# Patient Record
Sex: FEMALE | Race: WHITE | NOT HISPANIC OR LATINO | Employment: OTHER | URBAN - METROPOLITAN AREA
[De-identification: names, ages, dates, MRNs, and addresses within clinical notes are randomized per-mention and may not be internally consistent; named-entity substitution may affect disease eponyms.]

---

## 2018-03-19 ENCOUNTER — OFFICE VISIT (OUTPATIENT)
Dept: URGENT CARE | Facility: CLINIC | Age: 58
End: 2018-03-19
Payer: COMMERCIAL

## 2018-03-19 VITALS
WEIGHT: 122 LBS | BODY MASS INDEX: 23.03 KG/M2 | HEIGHT: 61 IN | SYSTOLIC BLOOD PRESSURE: 120 MMHG | HEART RATE: 74 BPM | OXYGEN SATURATION: 98 % | TEMPERATURE: 99 F | DIASTOLIC BLOOD PRESSURE: 80 MMHG | RESPIRATION RATE: 18 BRPM

## 2018-03-19 DIAGNOSIS — R05.9 COUGH: ICD-10-CM

## 2018-03-19 DIAGNOSIS — J01.00 ACUTE NON-RECURRENT MAXILLARY SINUSITIS: Primary | ICD-10-CM

## 2018-03-19 PROCEDURE — 96372 THER/PROPH/DIAG INJ SC/IM: CPT | Mod: S$GLB,,, | Performed by: FAMILY MEDICINE

## 2018-03-19 PROCEDURE — 99203 OFFICE O/P NEW LOW 30 MIN: CPT | Mod: SA,25,S$GLB, | Performed by: PHYSICIAN ASSISTANT

## 2018-03-19 RX ORDER — BETAMETHASONE SODIUM PHOSPHATE AND BETAMETHASONE ACETATE 3; 3 MG/ML; MG/ML
6 INJECTION, SUSPENSION INTRA-ARTICULAR; INTRALESIONAL; INTRAMUSCULAR; SOFT TISSUE
Status: COMPLETED | OUTPATIENT
Start: 2018-03-19 | End: 2018-03-19

## 2018-03-19 RX ORDER — AMOXICILLIN AND CLAVULANATE POTASSIUM 875; 125 MG/1; MG/1
1 TABLET, FILM COATED ORAL 2 TIMES DAILY
Qty: 20 TABLET | Refills: 0 | Status: SHIPPED | OUTPATIENT
Start: 2018-03-19 | End: 2018-03-29

## 2018-03-19 RX ORDER — AZELASTINE HYDROCHLORIDE, FLUTICASONE PROPIONATE 137; 50 UG/1; UG/1
1 SPRAY, METERED NASAL 2 TIMES DAILY
Qty: 1 BOTTLE | Refills: 0 | Status: SHIPPED | OUTPATIENT
Start: 2018-03-19 | End: 2023-11-14

## 2018-03-19 RX ORDER — CODEINE PHOSPHATE AND GUAIFENESIN 10; 100 MG/5ML; MG/5ML
5 SOLUTION ORAL 3 TIMES DAILY PRN
Qty: 120 ML | Refills: 0 | Status: SHIPPED | OUTPATIENT
Start: 2018-03-19 | End: 2018-03-29

## 2018-03-19 RX ADMIN — BETAMETHASONE SODIUM PHOSPHATE AND BETAMETHASONE ACETATE 6 MG: 3; 3 INJECTION, SUSPENSION INTRA-ARTICULAR; INTRALESIONAL; INTRAMUSCULAR; SOFT TISSUE at 04:03

## 2018-03-19 NOTE — PATIENT INSTRUCTIONS
Sinusitis (No Antibiotics)    The sinuses are air-filled spaces within the bones of the face. They connect to the inside of the nose. Sinusitis is an inflammation of the tissue lining the sinus cavity. Sinus inflammation can occur during a cold. It can also be due to allergies to pollens and other particles in the air. It can cause symptoms such as sinus congestion, headache, sore throat, facial swelling and fullness. It may also cause a low-grade fever. No infection is present, and no antibiotic treatment is needed.  Home care  · Drink plenty of water, hot tea, and other liquids. This may help thin mucus. It also may promote sinus drainage.  · Heat may help soothe painful areas of the face. Use a towel soaked in hot water. Or,  the shower and direct the hot spray onto your face. Using a vaporizer along with a menthol rub at night may also help.   · An expectorant containing guaifenesin may help thin the mucus and promote drainage from the sinuses.  · Over-the-counter decongestants may be used unless a similar medicine was prescribed. Nasal sprays work the fastest. Use one that contains phenylephrine or oxymetazoline. First blow the nose gently. Then use the spray. Do not use these medicines more often than directed on the label or symptoms may get worse. You may also use tablets containing pseudoephedrine. Avoid products that combine ingredients, because side effects may be increased. Read labels. You can also ask the pharmacist for help. (NOTE: Persons with high blood pressure should not use decongestants. They can raise blood pressure.)  · Over-the-counter antihistamines may help if allergies contributed to your sinusitis.    · Use acetaminophen or ibuprofen to control pain, unless another pain medicine was prescribed. (If you have chronic liver or kidney disease or ever had a stomach ulcer, talk with your doctor before using these medicines. Aspirin should never be used in anyone under 18 years of age  who is ill with a fever. It may cause severe liver damage.)  · Use nasal rinses or irrigation as instructed by your health care provider.  · Don't smoke. This can worsen symptoms.  Follow-up care  Follow up with your healthcare provider or our staff if you are not improving within the next week.  When to seek medical advice  Call your healthcare provider if any of these occur:  · Green or yellow discharge from the nose or into the throat  · Facial pain or headache becoming more severe  · Stiff neck  · Unusual drowsiness or confusion  · Swelling of the forehead or eyelids  · Vision problems, including blurred or double vision  · Fever of 100.4ºF (38ºC) or higher, or as directed by your healthcare provider  · Seizure  · Breathing problems  · Symptoms not resolving within 10 days  Date Last Reviewed: 4/13/2015  © 2210-4694 [employer]. [address] [place] [address]. All rights reserved. This information is not intended as a substitute for professional medical care. Always follow your healthcare professional's instructions.      Please follow up with your Primary care provider within 2-5 days if your signs and symptoms have not resolved or worsen.     If your condition worsens or fails to improve we recommend that you receive another evaluation at the emergency room immediately or contact your primary medical clinic to discuss your concerns.   You must understand that you have received an Urgent Care treatment only and that you may be released before all of your medical problems are known or treated. You, the patient, will arrange for follow up care as instructed.     YOU HAVE BEEN GIVEN A PRESCRIPTION FOR ANTIBIOTICS. IT WAS ADVISED TO DELAY THE COURSE OF ANTIBIOTICS FOR 2-3 DAYS IF SYMPTOMS DO NOT RESOLVE. IT IMPORTANT TO FOLLOW THESE INSTRUCTIONS AS ANTIBIOTIC RESISTANCE IS HIGH. YOUR SYMPTOMS WILL LIKELY RESOLVE WITHOUT THIS PRESCRIPTIONS.

## 2018-03-19 NOTE — PROGRESS NOTES
"Subjective:       Patient ID: Jay Allison is a 57 y.o. female.    Vitals:  height is 5' 1" (1.549 m) and weight is 55.3 kg (122 lb). Her temperature is 98.5 °F (36.9 °C). Her blood pressure is 120/80 and her pulse is 74. Her respiration is 18 and oxygen saturation is 98%.     Chief Complaint: Sinus Problem    This is a 57 y.o. female with History reviewed. No pertinent past medical history.   who presents today with a chief complaint of a cough and sinus pressure.         Sinus Problem   This is a new problem. The current episode started in the past 7 days (Saturday ). The problem is unchanged. There has been no fever. Her pain is at a severity of 8/10 (Facial Pain ). The pain is severe. Associated symptoms include congestion, coughing, headaches, sinus pressure and sneezing. Pertinent negatives include no chills, ear pain, hoarse voice, shortness of breath or sore throat. (Ear Congestion) Past treatments include oral decongestants and spray decongestants. The treatment provided mild relief.     Review of Systems   Constitution: Negative for chills, fever and malaise/fatigue.   HENT: Positive for congestion, sinus pressure and sneezing. Negative for ear pain, hoarse voice and sore throat.         Facial Pain   Post Nasal Drip    Eyes: Negative for discharge and redness.   Cardiovascular: Negative for chest pain, dyspnea on exertion and leg swelling.   Respiratory: Positive for cough. Negative for shortness of breath, sputum production and wheezing.    Musculoskeletal: Negative for myalgias.   Gastrointestinal: Negative for abdominal pain and nausea.   Neurological: Positive for headaches.       Objective:      Physical Exam   Constitutional: She is oriented to person, place, and time. She appears well-developed and well-nourished. She is cooperative.  Non-toxic appearance. She does not appear ill. No distress.   HENT:   Head: Normocephalic and atraumatic.   Right Ear: Hearing, external ear and ear canal " normal. Tympanic membrane is injected.   Left Ear: Hearing, external ear and ear canal normal. Tympanic membrane is injected.   Nose: Mucosal edema, rhinorrhea and sinus tenderness present. No nasal deformity. No epistaxis. Right sinus exhibits maxillary sinus tenderness. Right sinus exhibits no frontal sinus tenderness. Left sinus exhibits maxillary sinus tenderness. Left sinus exhibits no frontal sinus tenderness.   Mouth/Throat: Uvula is midline and mucous membranes are normal. No trismus in the jaw. Normal dentition. No uvula swelling. Posterior oropharyngeal erythema present. Tonsils are 1+ on the right. Tonsils are 1+ on the left. No tonsillar exudate.   Eyes: Conjunctivae and lids are normal. No scleral icterus.   Sclera clear bilat   Neck: Trachea normal, full passive range of motion without pain and phonation normal. Neck supple.   Cardiovascular: Normal rate, regular rhythm, normal heart sounds, intact distal pulses and normal pulses.    Pulmonary/Chest: Effort normal and breath sounds normal. No respiratory distress.   Abdominal: Soft. Normal appearance and bowel sounds are normal. She exhibits no distension. There is no tenderness.   Musculoskeletal: Normal range of motion. She exhibits no edema or deformity.   Lymphadenopathy:        Head (right side): Submandibular adenopathy present. No submental, no tonsillar, no preauricular, no posterior auricular and no occipital adenopathy present.        Head (left side): Submandibular adenopathy present. No submental, no tonsillar, no preauricular, no posterior auricular and no occipital adenopathy present.     She has no cervical adenopathy.   Neurological: She is alert and oriented to person, place, and time. She exhibits normal muscle tone. Coordination normal.   Skin: Skin is warm, dry and intact. She is not diaphoretic. No pallor.   Psychiatric: She has a normal mood and affect. Her speech is normal and behavior is normal. Judgment and thought content  normal. Cognition and memory are normal.   Nursing note and vitals reviewed.      Assessment:       1. Acute non-recurrent maxillary sinusitis    2. Cough        Plan:         Acute non-recurrent maxillary sinusitis  -     azelastine-fluticasone 137-50 mcg/spray Spry nassal spray; 1 spray by Each Nare route 2 (two) times daily.  Dispense: 1 Bottle; Refill: 0  -     betamethasone acetate-betamethasone sodium phosphate injection 6 mg; Inject 1 mL (6 mg total) into the muscle one time.  -     amoxicillin-clavulanate 875-125mg (AUGMENTIN) 875-125 mg per tablet; Take 1 tablet by mouth 2 (two) times daily.  Dispense: 20 tablet; Refill: 0  -     guaifenesin-codeine 100-10 mg/5 ml (TUSSI-ORGANIDIN NR)  mg/5 mL syrup; Take 5 mLs by mouth 3 (three) times daily as needed for Cough.  Dispense: 120 mL; Refill: 0    Cough        Sinusitis (No Antibiotics)    The sinuses are air-filled spaces within the bones of the face. They connect to the inside of the nose. Sinusitis is an inflammation of the tissue lining the sinus cavity. Sinus inflammation can occur during a cold. It can also be due to allergies to pollens and other particles in the air. It can cause symptoms such as sinus congestion, headache, sore throat, facial swelling and fullness. It may also cause a low-grade fever. No infection is present, and no antibiotic treatment is needed.  Home care  · Drink plenty of water, hot tea, and other liquids. This may help thin mucus. It also may promote sinus drainage.  · Heat may help soothe painful areas of the face. Use a towel soaked in hot water. Or,  the shower and direct the hot spray onto your face. Using a vaporizer along with a menthol rub at night may also help.   · An expectorant containing guaifenesin may help thin the mucus and promote drainage from the sinuses.  · Over-the-counter decongestants may be used unless a similar medicine was prescribed. Nasal sprays work the fastest. Use one that contains  phenylephrine or oxymetazoline. First blow the nose gently. Then use the spray. Do not use these medicines more often than directed on the label or symptoms may get worse. You may also use tablets containing pseudoephedrine. Avoid products that combine ingredients, because side effects may be increased. Read labels. You can also ask the pharmacist for help. (NOTE: Persons with high blood pressure should not use decongestants. They can raise blood pressure.)  · Over-the-counter antihistamines may help if allergies contributed to your sinusitis.    · Use acetaminophen or ibuprofen to control pain, unless another pain medicine was prescribed. (If you have chronic liver or kidney disease or ever had a stomach ulcer, talk with your doctor before using these medicines. Aspirin should never be used in anyone under 18 years of age who is ill with a fever. It may cause severe liver damage.)  · Use nasal rinses or irrigation as instructed by your health care provider.  · Don't smoke. This can worsen symptoms.  Follow-up care  Follow up with your healthcare provider or our staff if you are not improving within the next week.  When to seek medical advice  Call your healthcare provider if any of these occur:  · Green or yellow discharge from the nose or into the throat  · Facial pain or headache becoming more severe  · Stiff neck  · Unusual drowsiness or confusion  · Swelling of the forehead or eyelids  · Vision problems, including blurred or double vision  · Fever of 100.4ºF (38ºC) or higher, or as directed by your healthcare provider  · Seizure  · Breathing problems  · Symptoms not resolving within 10 days  Date Last Reviewed: 4/13/2015 © 2000-2017 [employer]. [address] [place] [address], [place] [address]. All rights reserved. This information is not intended as a substitute for professional medical care. Always follow your healthcare professional's instructions.      Please follow up with your Primary care  provider within 2-5 days if your signs and symptoms have not resolved or worsen.     If your condition worsens or fails to improve we recommend that you receive another evaluation at the emergency room immediately or contact your primary medical clinic to discuss your concerns.   You must understand that you have received an Urgent Care treatment only and that you may be released before all of your medical problems are known or treated. You, the patient, will arrange for follow up care as instructed.     YOU HAVE BEEN GIVEN A PRESCRIPTION FOR ANTIBIOTICS. IT WAS ADVISED TO DELAY THE COURSE OF ANTIBIOTICS FOR 2-3 DAYS IF SYMPTOMS DO NOT RESOLVE. IT IMPORTANT TO FOLLOW THESE INSTRUCTIONS AS ANTIBIOTIC RESISTANCE IS HIGH. YOUR SYMPTOMS WILL LIKELY RESOLVE WITHOUT THIS PRESCRIPTIONS.

## 2018-03-21 ENCOUNTER — OFFICE VISIT (OUTPATIENT)
Dept: URGENT CARE | Facility: CLINIC | Age: 58
End: 2018-03-21
Payer: COMMERCIAL

## 2018-03-21 VITALS
SYSTOLIC BLOOD PRESSURE: 130 MMHG | HEIGHT: 61 IN | RESPIRATION RATE: 18 BRPM | OXYGEN SATURATION: 97 % | HEART RATE: 66 BPM | BODY MASS INDEX: 23.03 KG/M2 | TEMPERATURE: 98 F | WEIGHT: 122 LBS | DIASTOLIC BLOOD PRESSURE: 79 MMHG

## 2018-03-21 DIAGNOSIS — J01.90 ACUTE SINUSITIS, RECURRENCE NOT SPECIFIED, UNSPECIFIED LOCATION: Primary | ICD-10-CM

## 2018-03-21 DIAGNOSIS — J20.9 ACUTE BRONCHITIS, UNSPECIFIED ORGANISM: ICD-10-CM

## 2018-03-21 PROCEDURE — 99214 OFFICE O/P EST MOD 30 MIN: CPT | Mod: S$GLB,,, | Performed by: EMERGENCY MEDICINE

## 2018-03-21 RX ORDER — AZITHROMYCIN 250 MG/1
TABLET, FILM COATED ORAL
Qty: 6 TABLET | Refills: 0 | Status: SHIPPED | OUTPATIENT
Start: 2018-03-21 | End: 2018-12-08 | Stop reason: SDUPTHER

## 2018-03-21 NOTE — PATIENT INSTRUCTIONS
Stop the augmentin and continue taking your other medications as previously directed.    Toribio Mrogan MD  Go to the Emergency Department for any problems  Call your PCP for follow up next available.    Bronchitis, Antibiotic Treatment (Adult)    Bronchitis is an infection of the air passages (bronchial tubes) in your lungs. It often occurs when you have a cold. This illness is contagious during the first few days and is spread through the air by coughing and sneezing, or by direct contact (touching the sick person and then touching your own eyes, nose, or mouth).  Symptoms of bronchitis include cough with mucus (phlegm) and low-grade fever. Bronchitis usually lasts 7 to 14 days. Mild cases can be treated with simple home remedies. More severe infection is treated with an antibiotic.  Home care  Follow these guidelines when caring for yourself at home:  · If your symptoms are severe, rest at home for the first 2 to 3 days. When you go back to your usual activities, don't let yourself get too tired.  · Do not smoke. Also avoid being exposed to secondhand smoke.  · You may use over-the-counter medicines to control fever or pain, unless another medicine was prescribed. (Note: If you have chronic liver or kidney disease or have ever had a stomach ulcer or gastrointestinal bleeding, talk with your healthcare provider before using these medicines. Also talk to your provider if you are taking medicine to prevent blood clots.) Aspirin should never be given to anyone younger than 18 years of age who is ill with a viral infection or fever. It may cause severe liver or brain damage.  · Your appetite may be poor, so a light diet is fine. Avoid dehydration by drinking 6 to 8 glasses of fluids per day (such as water, soft drinks, sports drinks, juices, tea, or soup). Extra fluids will help loosen secretions in the nose and lungs.  · Over-the-counter cough, cold, and sore-throat medicines will not shorten the length of the  illness, but they may be helpful to reduce symptoms. (Note: Do not use decongestants if you have high blood pressure.)  · Finish all antibiotic medicine. Do this even if you are feeling better after only a few days.  Follow-up care  Follow up with your healthcare provider, or as advised. If you had an X-ray or ECG (electrocardiogram), a specialist will review it. You will be notified of any new findings that may affect your care.  Note: If you are age 65 or older, or if you have a chronic lung disease or condition that affects your immune system, or you smoke, talk to your healthcare provider about having pneumococcal vaccinations and a yearly influenza vaccination (flu shot).  When to seek medical advice  Call your healthcare provider right away if any of these occur:  · Fever of 100.4°F (38°C) or higher  · Coughing up increased amounts of colored sputum  · Weakness, drowsiness, headache, facial pain, ear pain, or a stiff neck  Call 911, or get immediate medical care  Contact emergency services right away if any of these occur.  · Coughing up blood  · Worsening weakness, drowsiness, headache, or stiff neck  · Trouble breathing, wheezing, or pain with breathing  Date Last Reviewed: 9/13/2015  © 5334-8838 [employer]. [address] [place] [address], [place] [address]. All rights reserved. This information is not intended as a substitute for professional medical care. Always follow your healthcare professional's instructions.        Sinusitis (Antibiotic Treatment)    The sinuses are air-filled spaces within the bones of the face. They connect to the inside of the nose. Sinusitis is an inflammation of the tissue lining the sinus cavity. Sinus inflammation can occur during a cold. It can also be due to allergies to pollens and other particles in the air. Sinusitis can cause symptoms of sinus congestion and fullness. A sinus infection causes fever, headache and facial pain. There is often green or yellow drainage  from the nose or into the back of the throat (post-nasal drip). You have been given antibiotics to treat this condition.  Home care:  · Take the full course of antibiotics as instructed. Do not stop taking them, even if you feel better.  · Drink plenty of water, hot tea, and other liquids. This may help thin mucus. It also may promote sinus drainage.  · Heat may help soothe painful areas of the face. Use a towel soaked in hot water. Or,  the shower and direct the hot spray onto your face. Using a vaporizer along with a menthol rub at night may also help.   · An expectorant containing guaifenesin may help thin the mucus and promote drainage from the sinuses.  · Over-the-counter decongestants may be used unless a similar medicine was prescribed. Nasal sprays work the fastest. Use one that contains phenylephrine or oxymetazoline. First blow the nose gently. Then use the spray. Do not use these medicines more often than directed on the label or symptoms may get worse. You may also use tablets containing pseudoephedrine. Avoid products that combine ingredients, because side effects may be increased. Read labels. You can also ask the pharmacist for help. (NOTE: Persons with high blood pressure should not use decongestants. They can raise blood pressure.)  · Over-the-counter antihistamines may help if allergies contributed to your sinusitis.    · Do not use nasal rinses or irrigation during an acute sinus infection, unless told to by your health care provider. Rinsing may spread the infection to other sinuses.  · Use acetaminophen or ibuprofen to control pain, unless another pain medicine was prescribed. (If you have chronic liver or kidney disease or ever had a stomach ulcer, talk with your doctor before using these medicines. Aspirin should never be used in anyone under 18 years of age who is ill with a fever. It may cause severe liver damage.)  · Don't smoke. This can worsen symptoms.  Follow-up care  Follow up  with your healthcare provider or our staff if you are not improving within the next week.  When to seek medical advice  Call your healthcare provider if any of these occur:  · Facial pain or headache becoming more severe  · Stiff neck  · Unusual drowsiness or confusion  · Swelling of the forehead or eyelids  · Vision problems, including blurred or double vision  · Fever of 100.4ºF (38ºC) or higher, or as directed by your healthcare provider  · Seizure  · Breathing problems  · Symptoms not resolving within 10 days  Date Last Reviewed: 4/13/2015  © 1284-2666 [employer]. [address] [place] [address]. All rights reserved. This information is not intended as a substitute for professional medical care. Always follow your healthcare professional's instructions.

## 2018-03-21 NOTE — PROGRESS NOTES
"Subjective:       Patient ID: Jay Allison is a 57 y.o. female.    Vitals:  height is 5' 1" (1.549 m) and weight is 55.3 kg (122 lb). Her temperature is 97.8 °F (36.6 °C). Her blood pressure is 130/79 and her pulse is 66. Her respiration is 18 and oxygen saturation is 97%.     Chief Complaint: Sinus Problem    Txed 2 d ago, chart reviewed, states sinus and ear issues improving but cough and chest congestion continues, states when has occurred in past that z-randi works well.      Sinus Problem   This is a new problem. The current episode started in the past 7 days. The problem has been gradually worsening since onset. There has been no fever. Associated symptoms include congestion, coughing and sinus pressure. Pertinent negatives include no chills, ear pain, headaches, hoarse voice, shortness of breath or sore throat. Past treatments include antibiotics and spray decongestants. The treatment provided no relief.     Review of Systems   Constitution: Negative for chills, fever and malaise/fatigue.   HENT: Positive for congestion and sinus pressure. Negative for ear pain, hoarse voice and sore throat.    Eyes: Negative for discharge and redness.   Cardiovascular: Negative for chest pain, dyspnea on exertion and leg swelling.   Respiratory: Positive for cough and sputum production. Negative for shortness of breath and wheezing.    Musculoskeletal: Negative for myalgias.   Gastrointestinal: Negative for abdominal pain and nausea.   Neurological: Negative for headaches.       Objective:      Physical Exam   Constitutional: She is oriented to person, place, and time. She appears well-developed and well-nourished.   Occas nonprod cough   HENT:   Head: Normocephalic and atraumatic.   Right Ear: External ear normal.   Left Ear: External ear normal.   Nose: Mucosal edema present. No rhinorrhea. Right sinus exhibits no maxillary sinus tenderness and no frontal sinus tenderness. Left sinus exhibits no maxillary sinus " tenderness and no frontal sinus tenderness.   Mouth/Throat: Uvula is midline, oropharynx is clear and moist and mucous membranes are normal.   Eyes: EOM are normal. Pupils are equal, round, and reactive to light.   Neck: Normal range of motion. Neck supple.   Cardiovascular: Normal rate, regular rhythm and normal heart sounds.    Pulmonary/Chest:   Occas coarse BS bilat posteriorly   Musculoskeletal: Normal range of motion.   Lymphadenopathy:     She has no cervical adenopathy.   Neurological: She is alert and oriented to person, place, and time.   Skin: Skin is warm and dry.   Psychiatric: She has a normal mood and affect. Her behavior is normal.       Assessment:       1. Acute sinusitis, recurrence not specified, unspecified location    2. Acute bronchitis, unspecified organism        Plan:         Acute sinusitis, recurrence not specified, unspecified location  -     azithromycin (ZITHROMAX Z-BHARGAVI) 250 MG tablet; Take 2 tablets (500 mg) on  Day 1,  followed by 1 tablet (250 mg) once daily on Days 2 through 5.  Dispense: 6 tablet; Refill: 0    Acute bronchitis, unspecified organism  -     azithromycin (ZITHROMAX Z-BHARGAVI) 250 MG tablet; Take 2 tablets (500 mg) on  Day 1,  followed by 1 tablet (250 mg) once daily on Days 2 through 5.  Dispense: 6 tablet; Refill: 0      Toribio Morgan MD  Go to the Emergency Department for any problems  Call your PCP for follow up next available.

## 2018-03-22 ENCOUNTER — TELEPHONE (OUTPATIENT)
Dept: URGENT CARE | Facility: CLINIC | Age: 58
End: 2018-03-22

## 2018-03-25 ENCOUNTER — TELEPHONE (OUTPATIENT)
Dept: URGENT CARE | Facility: CLINIC | Age: 58
End: 2018-03-25

## 2018-06-25 PROBLEM — J01.90 ACUTE SINUSITIS: Status: RESOLVED | Noted: 2018-03-21 | Resolved: 2018-06-25

## 2018-12-08 ENCOUNTER — OFFICE VISIT (OUTPATIENT)
Dept: URGENT CARE | Facility: CLINIC | Age: 58
End: 2018-12-08
Payer: COMMERCIAL

## 2018-12-08 VITALS
SYSTOLIC BLOOD PRESSURE: 122 MMHG | WEIGHT: 122 LBS | HEIGHT: 61 IN | OXYGEN SATURATION: 99 % | HEART RATE: 89 BPM | DIASTOLIC BLOOD PRESSURE: 82 MMHG | BODY MASS INDEX: 23.03 KG/M2 | RESPIRATION RATE: 18 BRPM | TEMPERATURE: 99 F

## 2018-12-08 DIAGNOSIS — J02.9 ACUTE PHARYNGITIS, UNSPECIFIED ETIOLOGY: ICD-10-CM

## 2018-12-08 DIAGNOSIS — R05.9 COUGH: Primary | ICD-10-CM

## 2018-12-08 DIAGNOSIS — J01.90 ACUTE SINUSITIS, RECURRENCE NOT SPECIFIED, UNSPECIFIED LOCATION: ICD-10-CM

## 2018-12-08 DIAGNOSIS — J20.9 ACUTE BRONCHITIS, UNSPECIFIED ORGANISM: ICD-10-CM

## 2018-12-08 LAB
CTP QC/QA: YES
FLUAV AG NPH QL: NEGATIVE
FLUBV AG NPH QL: NEGATIVE

## 2018-12-08 PROCEDURE — 87804 INFLUENZA ASSAY W/OPTIC: CPT | Mod: QW,S$GLB,, | Performed by: EMERGENCY MEDICINE

## 2018-12-08 PROCEDURE — 99214 OFFICE O/P EST MOD 30 MIN: CPT | Mod: 25,S$GLB,, | Performed by: EMERGENCY MEDICINE

## 2018-12-08 PROCEDURE — 96372 THER/PROPH/DIAG INJ SC/IM: CPT | Mod: S$GLB,,, | Performed by: EMERGENCY MEDICINE

## 2018-12-08 PROCEDURE — 3008F BODY MASS INDEX DOCD: CPT | Mod: CPTII,S$GLB,, | Performed by: EMERGENCY MEDICINE

## 2018-12-08 RX ORDER — AZITHROMYCIN 250 MG/1
TABLET, FILM COATED ORAL
Qty: 6 TABLET | Refills: 0 | Status: SHIPPED | OUTPATIENT
Start: 2018-12-08 | End: 2023-11-14

## 2018-12-08 RX ORDER — AZITHROMYCIN 250 MG/1
TABLET, FILM COATED ORAL
Qty: 6 TABLET | Refills: 0 | Status: SHIPPED | OUTPATIENT
Start: 2018-12-08 | End: 2018-12-08

## 2018-12-08 RX ORDER — BETAMETHASONE SODIUM PHOSPHATE AND BETAMETHASONE ACETATE 3; 3 MG/ML; MG/ML
6 INJECTION, SUSPENSION INTRA-ARTICULAR; INTRALESIONAL; INTRAMUSCULAR; SOFT TISSUE
Status: COMPLETED | OUTPATIENT
Start: 2018-12-08 | End: 2018-12-08

## 2018-12-08 RX ORDER — CODEINE PHOSPHATE AND GUAIFENESIN 10; 100 MG/5ML; MG/5ML
5-10 SOLUTION ORAL 3 TIMES DAILY PRN
Qty: 120 ML | Refills: 0 | Status: SHIPPED | OUTPATIENT
Start: 2018-12-08 | End: 2018-12-13

## 2018-12-08 RX ADMIN — BETAMETHASONE SODIUM PHOSPHATE AND BETAMETHASONE ACETATE 6 MG: 3; 3 INJECTION, SUSPENSION INTRA-ARTICULAR; INTRALESIONAL; INTRAMUSCULAR; SOFT TISSUE at 10:12

## 2018-12-08 NOTE — PATIENT INSTRUCTIONS
Toribio Morgan MD  Go to the Emergency Department for any problems  Call your PCP for follow up next available.    Cough, Chronic, Uncertain Cause (Adult)    Everyone has had a cough as part of the common cold, flu, or bronchitis. This kind of cough occurs along with an achy feeling, low-grade fever, nasal and sinus congestion, and a scratchy or sore throat. This usually gets better in 2 to 3 weeks. A cough that lasts longer than 3 weeks may be due to other causes.  If your cough does not improve over the next 2 weeks, further testing may be needed. Follow up with your healthcare provider as advised. Cough suppressants may be recommended. Based on your exam today, the exact cause of your cough is not certain. Below are some common causes for persistent cough.  Smokers cough  Smokers cough doesnt go away. If you continue to smoke, it only gets worse. The cough is from irritation in the air passages. Talk to your healthcare provider about quitting. Medicines or nicotine-replacement products, like gum or the patch, may make quitting easier.  Postnasal drip  A cough that is worse at night may be due to postnasal drip. Excess mucus in the nose drains from the back of your nose to your throat. This triggers the cough reflex. Postnasal drip may be due to a sinus infection or allergy. Common allergens include dust, tobacco smoke (both inhaled and secondhand smoke), environmental pollutants, pollen, mold, pets, cleaning agents, room deodorizers, and chemical fumes. Over-the-counter antihistamines or decongestants may be helpful for allergies. A sinus infection may requires antibiotic treatment. See your healthcare provider if symptoms continue.  Medicines  Certain prescribed medicines can cause a chronic cough in some people:  · ACE inhibitors for high blood pressure. These include benazepril, captopril, enalapril, fosinopril, lisinopril, quinapril, ramipril, and others.  · Beta-blockers for high blood pressure and other  conditions. These include propranolol, atenolol, metoprolol, nadolol, and others.  Let your healthcare provider know if you are taking any of these.  Asthma  Cough may be the only sign of mild asthma. You may have tests to find out if asthma is causing your cough. You may also take asthma medicine on a trial basis.  Acid reflux (heartburn, GERD)  The esophagus is the tube that carries food from the mouth to the stomach. A valve at its lower end prevents stomach acids from flowing upward. If this valve does not work properly, acid from the stomach enters the esophagus. This may cause a burning pain in the upper abdomen or lower chest, belching, or cough. Symptoms are often worse when lying flat. Avoid eating or drinking before bedtime. Try using extra pillows to raise your upper body, or place 4-inch blocks under the head of your bed. You may try an over-the-counter antacid or an acid-blocking medicine such as famotidine, cimetidine, ranitidine, esomeprazole, lansoprazole, or omeprazole. Stronger medicines for this condition can be prescribed by your healthcare provider.  Follow-up care  Follow up with your healthcare provider, or as advised, if your cough does not improve. Further testing may be needed.  Note: If an X-ray was taken, a specialist will review it. You will be notified of any new findings that may affect your care.  When to seek medical advice  Call your healthcare provider right away if any of these occur:  · Mild wheezing or difficulty breathing  · Fever of 100.4ºF (38ºC) or higher, or as directed by your healthcare provider  · Unexpected weight loss  · Coughing up large amounts of colored sputum  · Night sweats (sheets and pajamas get soaking wet)  Call 911, or get immediate medical care  Contact emergency services right away if any of these occur:  · Coughing up blood  · Moderate to severe trouble breathing or wheezing  Date Last Reviewed: 9/13/2015  © 0116-7921 [employer] [address] [id number]. All rights reserved. This information is not intended as a substitute for professional medical care. Always follow your healthcare professional's instructions.        Sinusitis (Antibiotic Treatment)    The sinuses are air-filled spaces within the bones of the face. They connect to the inside of the nose. Sinusitis is an inflammation of the tissue lining the sinus cavity. Sinus inflammation can occur during a cold. It can also be due to allergies to pollens and other particles in the air. Sinusitis can cause symptoms of sinus congestion and fullness. A sinus infection causes fever, headache and facial pain. There is often green or yellow drainage from the nose or into the back of the throat (post-nasal drip). You have been given antibiotics to treat this condition.  Home care:  · Take the full course of antibiotics as instructed. Do not stop taking them, even if you feel better.  · Drink plenty of water, hot tea, and other liquids. This may help thin mucus. It also may promote sinus drainage.  · Heat may help soothe painful areas of the face. Use a towel soaked in hot water. Or,  the shower and direct the hot spray onto your face. Using a vaporizer along with a menthol rub at night may also help.   · An expectorant containing guaifenesin may help thin the mucus and promote drainage from the sinuses.  · Over-the-counter decongestants may be used unless a similar medicine was prescribed. Nasal sprays work the fastest. Use one that contains phenylephrine or oxymetazoline. First blow the nose gently. Then use the spray. Do not use these medicines more often than directed on the label or symptoms may get worse. You may also use tablets containing pseudoephedrine. Avoid products that combine ingredients, because side effects may be increased. Read labels. You can also ask the pharmacist for help. (NOTE: Persons with high blood pressure should not use decongestants. They can  raise blood pressure.)  · Over-the-counter antihistamines may help if allergies contributed to your sinusitis.    · Do not use nasal rinses or irrigation during an acute sinus infection, unless told to by your health care provider. Rinsing may spread the infection to other sinuses.  · Use acetaminophen or ibuprofen to control pain, unless another pain medicine was prescribed. (If you have chronic liver or kidney disease or ever had a stomach ulcer, talk with your doctor before using these medicines. Aspirin should never be used in anyone under 18 years of age who is ill with a fever. It may cause severe liver damage.)  · Don't smoke. This can worsen symptoms.  Follow-up care  Follow up with your healthcare provider or our staff if you are not improving within the next week.  When to seek medical advice  Call your healthcare provider if any of these occur:  · Facial pain or headache becoming more severe  · Stiff neck  · Unusual drowsiness or confusion  · Swelling of the forehead or eyelids  · Vision problems, including blurred or double vision  · Fever of 100.4ºF (38ºC) or higher, or as directed by your healthcare provider  · Seizure  · Breathing problems  · Symptoms not resolving within 10 days  Date Last Reviewed: 4/13/2015  © 1897-3510 [employer]. [address] [place] [address], [place] [address]. All rights reserved. This information is not intended as a substitute for professional medical care. Always follow your healthcare professional's instructions.        Self-Care for Sore Throats    Sore throats happen for many reasons, such as colds, allergies, and infections caused by viruses or bacteria. In any case, your throat becomes red and sore. Your goal for self-care is to reduce your discomfort while giving your throat a chance to heal.  Moisten and soothe your throat  Tips include the following:  · Try a sip of water first thing after waking up.  · Keep your throat moist by drinking 6 or more glasses of  clear liquids every day.  · Run a cool-air humidifier in your room overnight.  · Avoid cigarette smoke.   · Suck on throat lozenges, cough drops, hard candy, ice chips, or frozen fruit-juice bars. Use the sugar-free versions if your diet or medical condition requires them.  Gargle to ease irritation  Gargling every hour or 2 can ease irritation. Try gargling with 1 of these solutions:  · 1/4 teaspoon of salt in 1/2 cup of warm water  · An over-the-counter anesthetic gargle  Use medicine for more relief  Over-the-counter medicine can reduce sore throat symptoms. Ask your pharmacist if you have questions about which medicine to use:  · Ease pain with anesthetic sprays. Aspirin or an aspirin substitute also helps. Remember, never give aspirin to anyone 18 or younger, or if you are already taking blood thinners.   · For sore throats caused by allergies, try antihistamines to block the allergic reaction.  · Remember: unless a sore throat is caused by a bacterial infection, antibiotics wont help you.  Prevent future sore throats  Prevention tips include the following:  · Stop smoking or reduce contact with secondhand smoke. Smoke irritates the tender throat lining.  · Limit contact with pets and with allergy-causing substances, such as pollen and mold.  · When youre around someone with a sore throat or cold, wash your hands often to keep viruses or bacteria from spreading.  · Dont strain your vocal cords.  Call your healthcare provider  Contact your healthcare provider if you have:  · A temperature over 101°F (38.3°C)  · White spots on the throat  · Great difficulty swallowing  · Trouble breathing  · A skin rash  · Recent exposure to someone else with strep bacteria  · Severe hoarseness and swollen glands in the neck or jaw   Date Last Reviewed: 8/1/2016  © 5506-7651 [employer]. [address] [place] [address], [place] [address]. All rights reserved. This information is not intended as a substitute for  professional medical care. Always follow your healthcare professional's instructions.

## 2018-12-08 NOTE — PROGRESS NOTES
"Subjective:       Patient ID: Jay Allison is a 58 y.o. female.    Vitals:  height is 5' 1" (1.549 m) and weight is 55.3 kg (122 lb). Her oral temperature is 98.7 °F (37.1 °C). Her blood pressure is 122/82 and her pulse is 89. Her respiration is 18 and oxygen saturation is 99%.     Chief Complaint: Sinus Problem    States steroid IM/z-randi/codeine cough med has helped in past.      Sinus Problem   This is a new problem. Episode onset: 5 day. The problem has been gradually worsening since onset. There has been no fever. Her pain is at a severity of 4/10. The pain is moderate. Associated symptoms include chills, congestion, coughing and headaches. Pertinent negatives include no diaphoresis, ear pain, shortness of breath, sinus pressure or sore throat. Past treatments include spray decongestants. The treatment provided no relief.       Constitution: Positive for chills. Negative for sweating, fatigue and fever.   HENT: Positive for congestion and postnasal drip. Negative for ear pain, sinus pain, sinus pressure, sore throat and voice change.    Neck: Negative for painful lymph nodes.   Eyes: Negative for eye redness.   Respiratory: Positive for cough and sputum production. Negative for chest tightness, bloody sputum, COPD, shortness of breath, stridor, wheezing and asthma.    Gastrointestinal: Negative for nausea and vomiting.   Musculoskeletal: Positive for muscle ache.   Skin: Negative for rash.   Allergic/Immunologic: Negative for seasonal allergies and asthma.   Neurological: Positive for headaches.   Hematologic/Lymphatic: Negative for swollen lymph nodes.       Objective:      Physical Exam   Constitutional: She is oriented to person, place, and time.   Occas non prod cough   HENT:   Head: Normocephalic and atraumatic.   Right Ear: External ear and ear canal normal. A middle ear effusion is present.   Left Ear: Tympanic membrane, external ear and ear canal normal.   Nose: Mucosal edema present. No " rhinorrhea. Right sinus exhibits maxillary sinus tenderness and frontal sinus tenderness. Left sinus exhibits maxillary sinus tenderness and frontal sinus tenderness.   Mouth/Throat: Uvula is midline and mucous membranes are normal. Posterior oropharyngeal erythema present. No oropharyngeal exudate or posterior oropharyngeal edema.   Neck: Normal range of motion. Neck supple.   Cardiovascular: Normal rate, regular rhythm and normal heart sounds.   Pulmonary/Chest: Breath sounds normal.   Musculoskeletal: Normal range of motion.   Lymphadenopathy:     She has no cervical adenopathy.   Neurological: She is alert and oriented to person, place, and time.   Skin: Skin is warm and dry.   Psychiatric: She has a normal mood and affect. Her behavior is normal.       Assessment:       1. Cough    2. Acute sinusitis, recurrence not specified, unspecified location    3. Acute pharyngitis, unspecified etiology    4. Acute bronchitis, unspecified organism        Plan:         Cough  -     POCT Influenza A/B  -     guaifenesin-codeine 100-10 mg/5 ml (TUSSI-ORGANIDIN NR)  mg/5 mL syrup; Take 5-10 mLs by mouth 3 (three) times daily as needed for Cough.  Dispense: 120 mL; Refill: 0    Acute sinusitis, recurrence not specified, unspecified location  -     betamethasone acetate-betamethasone sodium phosphate injection 6 mg  -     Discontinue: azithromycin (ZITHROMAX Z-BHARGAVI) 250 MG tablet; Take 2 tablets (500 mg) on  Day 1,  followed by 1 tablet (250 mg) once daily on Days 2 through 5.  Dispense: 6 tablet; Refill: 0  -     azithromycin (ZITHROMAX Z-BHARGAVI) 250 MG tablet; Take 2 tablets (500 mg) on  Day 1,  followed by 1 tablet (250 mg) once daily on Days 2 through 5.  Dispense: 6 tablet; Refill: 0    Acute pharyngitis, unspecified etiology  -     betamethasone acetate-betamethasone sodium phosphate injection 6 mg  -     Discontinue: azithromycin (ZITHROMAX Z-BHARGAVI) 250 MG tablet; Take 2 tablets (500 mg) on  Day 1,  followed by 1 tablet  (250 mg) once daily on Days 2 through 5.  Dispense: 6 tablet; Refill: 0    Acute bronchitis, unspecified organism  -     azithromycin (ZITHROMAX Z-BHARGAVI) 250 MG tablet; Take 2 tablets (500 mg) on  Day 1,  followed by 1 tablet (250 mg) once daily on Days 2 through 5.  Dispense: 6 tablet; Refill: 0        Toribio Morgan MD  Go to the Emergency Department for any problems  Call your PCP for follow up next available.

## 2018-12-11 ENCOUNTER — TELEPHONE (OUTPATIENT)
Dept: URGENT CARE | Facility: CLINIC | Age: 58
End: 2018-12-11

## 2019-03-11 PROBLEM — J01.90 ACUTE SINUSITIS: Status: RESOLVED | Noted: 2018-12-08 | Resolved: 2019-03-11

## 2019-09-25 ENCOUNTER — OFFICE VISIT (OUTPATIENT)
Dept: OBSTETRICS AND GYNECOLOGY | Facility: CLINIC | Age: 59
End: 2019-09-25
Payer: COMMERCIAL

## 2019-09-25 VITALS
HEIGHT: 61 IN | WEIGHT: 121.13 LBS | DIASTOLIC BLOOD PRESSURE: 68 MMHG | BODY MASS INDEX: 22.87 KG/M2 | SYSTOLIC BLOOD PRESSURE: 115 MMHG

## 2019-09-25 DIAGNOSIS — Z01.419 WELL WOMAN EXAM WITH ROUTINE GYNECOLOGICAL EXAM: Primary | ICD-10-CM

## 2019-09-25 DIAGNOSIS — Z12.31 ENCOUNTER FOR SCREENING MAMMOGRAM FOR MALIGNANT NEOPLASM OF BREAST: ICD-10-CM

## 2019-09-25 PROCEDURE — 99999 PR PBB SHADOW E&M-EST. PATIENT-LVL III: ICD-10-PCS | Mod: PBBFAC,,, | Performed by: OBSTETRICS & GYNECOLOGY

## 2019-09-25 PROCEDURE — 99396 PR PREVENTIVE VISIT,EST,40-64: ICD-10-PCS | Mod: S$GLB,,, | Performed by: OBSTETRICS & GYNECOLOGY

## 2019-09-25 PROCEDURE — 99999 PR PBB SHADOW E&M-EST. PATIENT-LVL III: CPT | Mod: PBBFAC,,, | Performed by: OBSTETRICS & GYNECOLOGY

## 2019-09-25 PROCEDURE — 99396 PREV VISIT EST AGE 40-64: CPT | Mod: S$GLB,,, | Performed by: OBSTETRICS & GYNECOLOGY

## 2019-09-25 RX ORDER — ESTRADIOL 0.1 MG/G
CREAM VAGINAL
Qty: 42.5 G | Refills: 1 | Status: SHIPPED | OUTPATIENT
Start: 2019-09-25 | End: 2020-07-16 | Stop reason: SDUPTHER

## 2019-09-25 NOTE — PROGRESS NOTES
"OBSTETRIC HISTORY:   OB History        2    Para   2    Term                AB        Living           SAB        TAB        Ectopic        Multiple        Live Births                      COMPREHENSIVE GYN HISTORY:  PAP History: Reports abnormal Paps. Only mild dysplasia  Infection History: Denies STDs. Denies PID.  Benign History: Denies uterine fibroids. Denies ovarian cysts. Denies endometriosis.   Cancer History: Denies cervical cancer. Denies uterine cancer or hyperplasia. Denies ovarian cancer. Denies vulvar cancer or pre-cancer. Denies vaginal cancer or pre-cancer. Denies breast cancer. Denies colon cancer.  Sexual Activity History:   reports that she currently engages in sexual activity.       HPI:   58 y.o. No LMP recorded. Patient has had a hysterectomy.    Patient is  here for her annual gynecologic exam.  She has complaints of vaginal dryness. She denies bladder, bowel and breast complaints.    ROS:  GENERAL: Denies weight gain or weight loss. Feeling well overall.   SKIN: Denies rash or lesions.   HEAD: Denies headache.   NODES: Denies enlarged lymph nodes.   CHEST: Denies shortness of breath.   ABDOMEN: No abdominal pain, constipation, diarrhea, nausea, vomiting or rectal bleeding.   URINARY: No frequency, dysuria, hematuria, or burning on urination.  REPRODUCTIVE: See HPI.   BREASTS: The patient denies pain, lumps, or nipple discharge.   HEMATOLOGIC: No easy bruisability.   MUSCULOSKELETAL: Denies joint pain or back pain.   NEUROLOGIC: Denies weakness.   PSYCHIATRIC: Denies depression, anxiety or mood swings.        PE:   /68   Ht 5' 1" (1.549 m)   Wt 54.9 kg (121 lb 1.6 oz)   BMI 22.88 kg/m²   APPEARANCE: Well nourished, well developed, in no acute distress.  NECK: Neck symmetric without thyromegaly.  NODES: No inguinal or cervical lymph node enlargement.  CHEST: Lungs clear to auscultation.  HEART: Regular rate and rhythm, no murmurs, rubs or gallops.  ABDOMEN: Soft. No " tenderness or masses. No hernias.  BREASTS: Symmetrical, no skin changes or visible lesions. No palpable masses, nipple discharge or adenopathy bilaterally.  VULVA: No lesions. Normal female genitalia.  URETHRAL MEATUS: Normal size and location, no lesions, no prolapse.  URETHRA: No masses, tenderness, prolapse or scarring.  VAGINA: Atrophic and not well rugated, no discharge, no significant cystocele or rectocele.  CERVIX AND UTERUS SURGICALLY ABSENT.   ADNEXA: No masses or tenderness.    PROCEDURES:  Pap smear -- NOT INDICATED    Assessment:  Normal Gynecologic Exam    Plan:  Mammogram and Colonoscopy if indicated by current recommendations.  Return to clinic in one year or for any problems or complaints.    Counseling:  Patient was counseled today on A.C.S. Pap guidelines and recommendations for yearly pelvic exams and monthly self breast exams; to see her PCP for other health maintenance. Regular exercise and healthy diet.

## 2020-07-16 ENCOUNTER — TELEPHONE (OUTPATIENT)
Dept: OBSTETRICS AND GYNECOLOGY | Facility: CLINIC | Age: 60
End: 2020-07-16

## 2020-07-16 RX ORDER — ESTRADIOL 0.1 MG/G
CREAM VAGINAL
Qty: 42.5 G | Refills: 1 | Status: SHIPPED | OUTPATIENT
Start: 2020-07-16 | End: 2023-11-14

## 2020-07-16 NOTE — TELEPHONE ENCOUNTER
----- Message from Rubi Alonso sent at 7/16/2020  3:39 PM CDT -----  Regarding: FW: Refills  Contact: [phone]  Patient does not have an appointment was last seen 2016 for pap .  ----- Message -----  From: Ariane Guzmán  Sent: 7/16/2020   1:17 PM CDT  To: Errol BASURTO Staff  Subject: Refills                                          Patient is calling to get refills on her medication sent to [facility] - [facility] - [address] [facility] [phone] (Phone) [phone] (Fax)    1. estradiol (ESTRACE) 0.01 % (0.1 mg/gram) vaginal cream 42.5 g

## 2021-01-28 ENCOUNTER — OFFICE VISIT (OUTPATIENT)
Dept: URGENT CARE | Facility: CLINIC | Age: 61
End: 2021-01-28
Payer: COMMERCIAL

## 2021-01-28 VITALS
OXYGEN SATURATION: 100 % | WEIGHT: 117 LBS | BODY MASS INDEX: 22.09 KG/M2 | SYSTOLIC BLOOD PRESSURE: 160 MMHG | HEIGHT: 61 IN | DIASTOLIC BLOOD PRESSURE: 84 MMHG | HEART RATE: 89 BPM | RESPIRATION RATE: 18 BRPM | TEMPERATURE: 98 F

## 2021-01-28 DIAGNOSIS — R07.89 CHEST WALL PAIN: Primary | ICD-10-CM

## 2021-01-28 PROCEDURE — 71046 XR CHEST PA AND LATERAL: ICD-10-PCS | Mod: FY,S$GLB,, | Performed by: RADIOLOGY

## 2021-01-28 PROCEDURE — 99214 OFFICE O/P EST MOD 30 MIN: CPT | Mod: S$GLB,,, | Performed by: PHYSICIAN ASSISTANT

## 2021-01-28 PROCEDURE — 99214 PR OFFICE/OUTPT VISIT, EST, LEVL IV, 30-39 MIN: ICD-10-PCS | Mod: S$GLB,,, | Performed by: PHYSICIAN ASSISTANT

## 2021-01-28 PROCEDURE — 71046 X-RAY EXAM CHEST 2 VIEWS: CPT | Mod: FY,S$GLB,, | Performed by: RADIOLOGY

## 2021-01-28 RX ORDER — NAPROXEN 500 MG/1
500 TABLET ORAL 2 TIMES DAILY WITH MEALS
Qty: 20 TABLET | Refills: 1 | Status: SHIPPED | OUTPATIENT
Start: 2021-01-28 | End: 2023-11-14

## 2021-01-28 RX ORDER — METHOCARBAMOL 750 MG/1
750 TABLET, FILM COATED ORAL 3 TIMES DAILY
Qty: 30 TABLET | Refills: 0 | Status: SHIPPED | OUTPATIENT
Start: 2021-01-28 | End: 2021-02-07

## 2021-04-20 ENCOUNTER — OFFICE VISIT (OUTPATIENT)
Dept: OPTOMETRY | Facility: CLINIC | Age: 61
End: 2021-04-20
Payer: COMMERCIAL

## 2021-04-20 DIAGNOSIS — H26.9 CORTICAL CATARACT OF BOTH EYES: Primary | ICD-10-CM

## 2021-04-20 DIAGNOSIS — H52.7 REFRACTIVE ERROR: ICD-10-CM

## 2021-04-20 DIAGNOSIS — H04.123 DRY EYE SYNDROME, BILATERAL: ICD-10-CM

## 2021-04-20 PROCEDURE — 92004 PR EYE EXAM, NEW PATIENT,COMPREHESV: ICD-10-PCS | Mod: S$GLB,,, | Performed by: OPTOMETRIST

## 2021-04-20 PROCEDURE — 92015 PR REFRACTION: ICD-10-PCS | Mod: S$GLB,,, | Performed by: OPTOMETRIST

## 2021-04-20 PROCEDURE — 92015 DETERMINE REFRACTIVE STATE: CPT | Mod: S$GLB,,, | Performed by: OPTOMETRIST

## 2021-04-20 PROCEDURE — 99999 PR PBB SHADOW E&M-EST. PATIENT-LVL II: ICD-10-PCS | Mod: PBBFAC,,, | Performed by: OPTOMETRIST

## 2021-04-20 PROCEDURE — 99999 PR PBB SHADOW E&M-EST. PATIENT-LVL II: CPT | Mod: PBBFAC,,, | Performed by: OPTOMETRIST

## 2021-04-20 PROCEDURE — 92004 COMPRE OPH EXAM NEW PT 1/>: CPT | Mod: S$GLB,,, | Performed by: OPTOMETRIST

## 2022-01-20 ENCOUNTER — LAB VISIT (OUTPATIENT)
Dept: FAMILY MEDICINE | Facility: CLINIC | Age: 62
End: 2022-01-20
Payer: COMMERCIAL

## 2022-01-20 DIAGNOSIS — Z11.52 ENCOUNTER FOR SCREENING FOR SEVERE ACUTE RESPIRATORY SYNDROME CORONAVIRUS 2 (SARS-COV-2) INFECTION: Primary | ICD-10-CM

## 2022-01-20 LAB
CTP QC/QA: YES
SARS-COV-2 AG RESP QL IA.RAPID: NEGATIVE

## 2022-01-20 PROCEDURE — 87426 SARSCOV CORONAVIRUS AG IA: CPT | Mod: QW,S$GLB,, | Performed by: FAMILY MEDICINE

## 2022-01-20 PROCEDURE — 87426 SARS CORONAVIRUS 2 ANTIGEN POCT: ICD-10-PCS | Mod: QW,S$GLB,, | Performed by: FAMILY MEDICINE

## 2022-01-20 NOTE — PROGRESS NOTES
This test is a lateral flow immunoassay intended for the qualitative detection of nucleocapsid protein antigen from SARS- CoV-2 within the first seven days of symptom onset.  Positive results indicate the presence of viral antigens, but clinical correlation with patient history and other diagnostic information is necessary to determine infection status.  Negative results from patients with symptom onset beyond seven days, should be treated as presumptive and confirmation with a molecular assay, if necessary, for patient management, may be performed. Negative results do not rule out SARS-CoV-2 infection and should not be used as the sole basis for treatment or patient management decisions, including infection control decisions.    This test is only for use under the Food and Drug Administration s Emergency Use Authorization (EUA). Commercial kits are provided by [employer].   _________________________________________________________________   The authorized Fact Sheet for Healthcare Providers and the authorized Fact   Sheet for Patients of the ID NOW COVID-19 are available on the FDA   website:   https://www.fda.gov/media/090201/download  https://www.fda.gov/media/696340/download

## 2023-04-17 NOTE — TELEPHONE ENCOUNTER
Callback dos 03/19/18 - Patient states she is still not feeling better, she came back into the clinic yesterday and started a different antibiotic.   Nutrition Problem #1: Underweight  Intervention: Food and/or Nutrient Delivery: Continue NPO (carb control 4 with advancement with diabetic supplement TID)  Nutritional  Goals: ability to advance diet

## 2023-07-14 ENCOUNTER — OFFICE VISIT (OUTPATIENT)
Dept: URGENT CARE | Facility: CLINIC | Age: 63
End: 2023-07-14
Payer: COMMERCIAL

## 2023-07-14 VITALS
SYSTOLIC BLOOD PRESSURE: 138 MMHG | BODY MASS INDEX: 21.99 KG/M2 | HEART RATE: 60 BPM | RESPIRATION RATE: 16 BRPM | OXYGEN SATURATION: 99 % | HEIGHT: 60 IN | WEIGHT: 112 LBS | DIASTOLIC BLOOD PRESSURE: 74 MMHG | TEMPERATURE: 98 F

## 2023-07-14 NOTE — PROGRESS NOTES
Subjective:      Patient ID: Jay Allison is a 62 y.o. female.    Vitals:  vitals were not taken for this visit.     Chief Complaint: Suture / Staple Removal    Patient stated she had sutures placed at [facility] 1 week ago.  She has new redness and some swelling to her left ankle.     Suture / Staple Removal  The sutures were placed 7 to 10 days ago. She tried nothing since the wound repair. The treatment provided no relief. Her temperature was unmeasured prior to arrival. There has been no drainage from the wound. There is new redness present. There is no swelling present. There is no pain present. There is difficulty moving the extremity or digit due to pain.   ROS   Objective:     Physical Exam    Assessment:     No diagnosis found.    Plan:       There are no diagnoses linked to this encounter.

## 2023-07-17 ENCOUNTER — TELEPHONE (OUTPATIENT)
Dept: URGENT CARE | Facility: CLINIC | Age: 63
End: 2023-07-17
Payer: COMMERCIAL

## 2023-07-21 ENCOUNTER — OFFICE VISIT (OUTPATIENT)
Dept: URGENT CARE | Facility: CLINIC | Age: 63
End: 2023-07-21
Payer: COMMERCIAL

## 2023-07-21 VITALS
OXYGEN SATURATION: 100 % | HEIGHT: 60 IN | WEIGHT: 112 LBS | SYSTOLIC BLOOD PRESSURE: 152 MMHG | HEART RATE: 74 BPM | DIASTOLIC BLOOD PRESSURE: 71 MMHG | TEMPERATURE: 98 F | RESPIRATION RATE: 18 BRPM | BODY MASS INDEX: 21.99 KG/M2

## 2023-07-21 DIAGNOSIS — T81.30XA WOUND DEHISCENCE: Primary | ICD-10-CM

## 2023-07-21 PROCEDURE — 99213 PR OFFICE/OUTPT VISIT, EST, LEVL III, 20-29 MIN: ICD-10-PCS | Mod: S$GLB,,, | Performed by: PHYSICIAN ASSISTANT

## 2023-07-21 PROCEDURE — 99213 OFFICE O/P EST LOW 20 MIN: CPT | Mod: S$GLB,,, | Performed by: PHYSICIAN ASSISTANT

## 2023-07-21 NOTE — PATIENT INSTRUCTIONS
You must understand that you've received an Urgent Care treatment only and that you may be released before all your medical problems are known or treated. You, the patient, will arrange for follow up care as instructed.      Follow up with your PCP or specialty clinic as instructed in the next 2-3 days if not improved or as needed. You can call [phone] to schedule an appointment with appropriate provider.      If you condition worsens, we recommend that you receive another evaluation at the emergency room immediately or contact your primary medical clinic's after hours call service to discuss your concerns.      Please return here or go to the Emergency Department for any concerns or worsening condition.      If you were prescribed a narcotic or controlled substance, do not drive or operate heavy equipment or machinery while taking these medications.

## 2023-07-21 NOTE — PROCEDURES
Laceration Repair    Date/Time: 7/21/2023 10:45 AM  Performed by: Kylah Lee PA-C  Authorized by: Kylah Lee PA-C   Consent Done: Yes  Consent: Verbal consent obtained.  Risks and benefits: risks, benefits and alternatives were discussed  Consent given by: patient  Body area: lower extremity  Location details: right ankle  Laceration length: 3 cm  Foreign bodies: no foreign bodies  Tendon involvement: none  Nerve involvement: none  Irrigation solution: saline  Irrigation method: syringe  Amount of cleaning: standard  Skin closure: Steri-Strips  Dressing: Steri-Strips  Patient tolerance: Patient tolerated the procedure well with no immediate complications

## 2023-07-21 NOTE — PROGRESS NOTES
Subjective:      Patient ID: Jya Allison is a 62 y.o. female.    Vitals:  height is 5' (1.524 m) and weight is 50.8 kg (112 lb). Her oral temperature is 97.7 °F (36.5 °C). Her blood pressure is 152/71 (abnormal) and her pulse is 74. Her respiration is 18 and oxygen saturation is 100%.     Chief Complaint: Laceration    Patient stated her left leg laceration happened about 2 weeks ago.  She had the sutures removed 07/19/2023 and the laceration opened back up 1 day ago.      Patient provider note starts here:  Patient presents with complaints of wound dehiscence on the left ankle.  Originally had sutures placed to the posterior aspect of the left ankle about 2 weeks ago after a storm door caught her while walking inside with flip-flops.  She presented here 7 days later however, the wound was not healed appropriately and she was advised to keep the sutures in longer periods on outside facility a few days ago to have the sutures removed.  Reports that yesterday, the wound open back up.  Reports that she had bleeding when the scab was removed yesterday.  The bleeding has since stopped however, she is concerned that she now has an open wound.    Laceration   The incident occurred more than 1 week ago. The laceration is located on the Left leg. The laceration mechanism is unknown.The pain is at a severity of 1/10. The pain is mild. The pain has been Constant since onset. She reports no foreign bodies present. Her tetanus status is UTD.     Constitution: Negative for fever.   Neck: Negative for neck pain.   Cardiovascular:  Negative for chest pain, palpitations and sob on exertion.   Respiratory:  Negative for chest tightness and wheezing.    Gastrointestinal:  Negative for abdominal pain, vomiting and diarrhea.   Musculoskeletal:  Positive for pain.   Skin:  Positive for laceration. Negative for color change, wound and erythema.   Neurological:  Negative for numbness and tingling.    Objective:     Physical Exam    Constitutional: She is oriented to person, place, and time. She appears well-developed.   HENT:   Head: Normocephalic and atraumatic. Head is without abrasion, without contusion and without laceration.   Ears:   Right Ear: External ear normal.   Left Ear: External ear normal.   Nose: Nose normal.   Mouth/Throat: Oropharynx is clear and moist and mucous membranes are normal.   Eyes: Conjunctivae, EOM and lids are normal. Pupils are equal, round, and reactive to light.   Neck: Trachea normal and phonation normal. Neck supple.   Cardiovascular: Normal rate.   Pulmonary/Chest: Effort normal. No stridor. No respiratory distress.   Musculoskeletal: Normal range of motion.         General: Normal range of motion.   Neurological: She is alert and oriented to person, place, and time.   Skin: Skin is warm, dry, intact and no rash. Capillary refill takes less than 2 seconds. No abrasion, No burn, No bruising, No erythema and No ecchymosis         Comments: Open wound noted over the posterior aspect of the left ankle near the Achilles tendon insertion.  No active bleeding.  Minimal scabbing.  No purulence or fluctuance.   Psychiatric: Her speech is normal and behavior is normal. Judgment and thought content normal.   Nursing note and vitals reviewed.    Assessment:     1. Wound dehiscence    Laceration Repair    Date/Time: 7/21/2023 10:45 AM  Performed by: Kylah Lee PA-C  Authorized by: Kylah Lee PA-C   Consent Done: Yes  Consent: Verbal consent obtained.  Risks and benefits: risks, benefits and alternatives were discussed  Consent given by: patient  Body area: lower extremity  Location details: right ankle  Laceration length: 3 cm  Foreign bodies: no foreign bodies  Tendon involvement: none  Nerve involvement: none  Irrigation solution: saline  Irrigation method: syringe  Amount of cleaning: standard  Skin closure: Steri-Strips  Dressing: Steri-Strips  Patient tolerance: Patient tolerated the procedure  well with no immediate complications          Plan:       Wound dehiscence  -     Laceration Repair          Medical Decision Making:   History:   Old Medical Records: I decided to obtain old medical records.  Urgent Care Management:  A. Problem List:   -Acute:  Wound dehiscence   -Chronic:  None  B. Differential diagnosis:  Wound dehiscence, cellulitis, infected wound, laceration, Achilles tendon injury  C. Diagnostic Testing Ordered:  None  D. Diagnostic Testing Considered:  None  E. Independent Historians:  None  F. Urgent Care Midlevel Independent Results Interpretation:   G. Radiology:  H. Review of Previous Medical Records:  Sutures were placed in the emergency department 7/5.  Presented here 7/14 however, sutures were not ready to be removed.  I. Home Medications Reviewed  J. Social Determinants of Health considered  K. Medical Decision Making and Disposition:  Patient presents with complaints of a laceration opening back up after having sutures removed a few days ago.  On exam, she is afebrile and nontoxic in appearance.  Open wound noted to the posterior aspect of the left ankle over the Achilles tendon insertion.  No active bleeding.  Steri-Strips were placed over the wound in hopes of holding it together.  I believe this happened due to it being an high tension area when walking flat footed.  She was advised to stay off of her foot as much as possible and try to walk on her toes to prevent excess tension on the back of the ankle.  I did offer crutches however, patient declined at this time.  Advised follow-up closely with primary care and monitor for evidence of infection.  She verbalized understanding and agreed with plan.       Patient Instructions   You must understand that you've received an Urgent Care treatment only and that you may be released before all your medical problems are known or treated. You, the patient, will arrange for follow up care as instructed.      Follow up with your PCP or  specialty clinic as instructed in the next 2-3 days if not improved or as needed. You can call [phone] to schedule an appointment with appropriate provider.      If you condition worsens, we recommend that you receive another evaluation at the emergency room immediately or contact your primary medical clinic's after hours call service to discuss your concerns.      Please return here or go to the Emergency Department for any concerns or worsening condition.      If you were prescribed a narcotic or controlled substance, do not drive or operate heavy equipment or machinery while taking these medications.

## 2023-07-24 ENCOUNTER — TELEPHONE (OUTPATIENT)
Dept: URGENT CARE | Facility: CLINIC | Age: 63
End: 2023-07-24
Payer: COMMERCIAL

## 2023-07-24 NOTE — TELEPHONE ENCOUNTER
Call back - Spoke with pt, she said she is doing good since her visit and the laceration is still closed.

## 2023-11-14 ENCOUNTER — OFFICE VISIT (OUTPATIENT)
Dept: URGENT CARE | Facility: CLINIC | Age: 63
End: 2023-11-14
Payer: COMMERCIAL

## 2023-11-14 VITALS
HEART RATE: 85 BPM | DIASTOLIC BLOOD PRESSURE: 81 MMHG | OXYGEN SATURATION: 98 % | TEMPERATURE: 99 F | BODY MASS INDEX: 22.19 KG/M2 | SYSTOLIC BLOOD PRESSURE: 141 MMHG | RESPIRATION RATE: 18 BRPM | HEIGHT: 60 IN | WEIGHT: 113 LBS

## 2023-11-14 DIAGNOSIS — R09.81 NASAL CONGESTION: ICD-10-CM

## 2023-11-14 DIAGNOSIS — R05.9 COUGH, UNSPECIFIED TYPE: ICD-10-CM

## 2023-11-14 DIAGNOSIS — U07.1 COVID-19: Primary | ICD-10-CM

## 2023-11-14 LAB
CTP QC/QA: YES
SARS-COV-2 AG RESP QL IA.RAPID: POSITIVE

## 2023-11-14 PROCEDURE — 99213 PR OFFICE/OUTPT VISIT, EST, LEVL III, 20-29 MIN: ICD-10-PCS | Mod: S$GLB,,, | Performed by: NURSE PRACTITIONER

## 2023-11-14 PROCEDURE — 87811 SARS-COV-2 COVID19 W/OPTIC: CPT | Mod: QW,S$GLB,, | Performed by: NURSE PRACTITIONER

## 2023-11-14 PROCEDURE — 99213 OFFICE O/P EST LOW 20 MIN: CPT | Mod: S$GLB,,, | Performed by: NURSE PRACTITIONER

## 2023-11-14 PROCEDURE — 87811 SARS CORONAVIRUS 2 ANTIGEN POCT, MANUAL READ: ICD-10-PCS | Mod: QW,S$GLB,, | Performed by: NURSE PRACTITIONER

## 2023-11-14 RX ORDER — PROMETHAZINE HYDROCHLORIDE AND DEXTROMETHORPHAN HYDROBROMIDE 6.25; 15 MG/5ML; MG/5ML
5 SYRUP ORAL EVERY 8 HOURS PRN
Qty: 100 ML | Refills: 0 | Status: SHIPPED | OUTPATIENT
Start: 2023-11-14 | End: 2023-11-21

## 2023-11-14 RX ORDER — FLUTICASONE PROPIONATE 50 MCG
1 SPRAY, SUSPENSION (ML) NASAL DAILY
Qty: 9.9 ML | Refills: 0 | Status: SHIPPED | OUTPATIENT
Start: 2023-11-14

## 2023-11-14 RX ORDER — NIRMATRELVIR AND RITONAVIR 300-100 MG
KIT ORAL
Qty: 30 TABLET | Refills: 0 | Status: SHIPPED | OUTPATIENT
Start: 2023-11-14 | End: 2023-11-19

## 2023-11-14 NOTE — PROGRESS NOTES
Subjective:      Patient ID: Jay Allison is a 62 y.o. female.    Vitals:  height is 5' (1.524 m) and weight is 51.3 kg (113 lb). Her oral temperature is 98.6 °F (37 °C). Her blood pressure is 141/81 (abnormal) and her pulse is 85. Her respiration is 18 and oxygen saturation is 98%.     Chief Complaint: Sinus Problem    63 y/o female presents with a complaint of sinus pain, nasal congestion, productive cough, sore throat, headaches, body aches, and nausea that started 2 days ago.     Sinus Problem  This is a new problem. Episode onset: 2 days ago. The problem has been gradually worsening since onset. There has been no fever. Her pain is at a severity of 5/10 (general pain). The pain is moderate. Associated symptoms include congestion, coughing, headaches, sinus pressure and a sore throat. Pertinent negatives include no chills, diaphoresis, ear pain, neck pain, shortness of breath or swollen glands. Treatments tried: claritin d. The treatment provided mild relief.       Constitution: Positive for appetite change. Negative for chills, sweating, fever, generalized weakness and international travel in last 60 days.   HENT:  Positive for congestion, postnasal drip, sinus pain, sinus pressure and sore throat. Negative for ear pain, nosebleeds, trouble swallowing and voice change.    Neck: Negative for neck pain.   Cardiovascular:  Negative for chest pain and palpitations.   Respiratory:  Positive for cough and sputum production. Negative for chest tightness, bloody sputum, COPD, shortness of breath, stridor, wheezing and asthma.    Gastrointestinal:  Positive for nausea. Negative for vomiting and diarrhea.   Musculoskeletal:  Positive for back pain and muscle ache. Negative for muscle cramps and history of spine disorder.   Allergic/Immunologic: Negative for asthma.   Neurological:  Positive for headaches. Negative for history of migraines.      Objective:     Physical Exam   Constitutional: She is oriented to  person, place, and time. She appears well-developed. She is cooperative.  Non-toxic appearance. She does not appear ill. No distress.   HENT:   Head: Normocephalic and atraumatic.   Ears:   Right Ear: Hearing, tympanic membrane, external ear and ear canal normal. impacted cerumen  Left Ear: Hearing, tympanic membrane, external ear and ear canal normal. impacted cerumen  Nose: Mucosal edema, rhinorrhea and congestion present. No purulent discharge, sinus tenderness or nasal deformity. No epistaxis. Right sinus exhibits no maxillary sinus tenderness and no frontal sinus tenderness. Left sinus exhibits no maxillary sinus tenderness and no frontal sinus tenderness.   Mouth/Throat: Uvula is midline, oropharynx is clear and moist and mucous membranes are normal. No trismus in the jaw. Normal dentition. No uvula swelling. No oropharyngeal exudate, posterior oropharyngeal edema or posterior oropharyngeal erythema.   Eyes: Conjunctivae and lids are normal. No scleral icterus.   Neck: Trachea normal and phonation normal. Neck supple. No edema present. No erythema present. No neck rigidity present.   Cardiovascular: Normal rate, regular rhythm, normal heart sounds and normal pulses.   Pulmonary/Chest: Effort normal and breath sounds normal. No stridor. No respiratory distress. She has no decreased breath sounds. She has no wheezes. She has no rhonchi. She has no rales. She exhibits no tenderness.   Abdominal: Normal appearance.   Musculoskeletal: Normal range of motion.         General: No deformity. Normal range of motion.   Neurological: She is alert and oriented to person, place, and time. She exhibits normal muscle tone. Coordination normal.   Skin: Skin is warm, dry, intact, not diaphoretic and not pale.   Psychiatric: Her speech is normal and behavior is normal. Judgment and thought content normal.   Nursing note and vitals reviewed.      Assessment:     1. COVID-19    2. Cough, unspecified type    3. Nasal congestion         Plan:       COVID-19  -     nirmatrelvir-ritonavir (PAXLOVID) 300 mg (150 mg x 2)-100 mg copackaged tablets (EUA); Take 3 tablets by mouth 2 (two) times daily. Each dose contains 2 nirmatrelvir (pink tablets) and 1 ritonavir (white tablet). Take all 3 tablets together  Dispense: 30 tablet; Refill: 0    Cough, unspecified type  -     SARS Coronavirus 2 Antigen, POCT Manual Read  -     promethazine-dextromethorphan (PROMETHAZINE-DM) 6.25-15 mg/5 mL Syrp; Take 5 mLs by mouth every 8 (eight) hours as needed (cough).  Dispense: 100 mL; Refill: 0    Nasal congestion  -     fluticasone propionate (FLONASE) 50 mcg/actuation nasal spray; 1 spray (50 mcg total) by Each Nostril route once daily.  Dispense: 9.9 mL; Refill: 0        POSITIVE COVID TEST    You have tested positive for COVID-19 today.           ISOLATION    If you tested positive and do not have symptoms, you must isolate for 5 days starting on the day of the positive test. I         If you tested positive and have symptoms, you must isolate for 5 days starting on the day of the first symptoms,  not the day of the positive test.         This is the most important part, both the CDC and the [facility] emphasize that you do not test out of isolation.         After 5 days, if your symptoms have improved and you have not had fever on day 5, you can return to the community on day 6- NO TESTING REQUIRED!          In fact, we do not retest if you were positive in the last 90 days.         After your 5 days of isolation are completed, the CDC recommends strict mask use for the first 5 days that you come out of isolation.      When to Seek Emergency Medical Attention  Look for emergency warning signs* for COVID-19. If someone is showing any of these signs, seek emergency medical care immediately:    Trouble breathing  Persistent pain or pressure in the chest  New confusion  Inability to wake or stay awake  Pale, gray, or blue-colored skin, lips, or nail beds, depending on  skin tone  *This list is not all possible symptoms. Please call your medical provider for any other symptoms that are severe or concerning to you.    Call 911 or call ahead to your local emergency facility: Notify the  that you are seeking care for someone who has or may have COVID-19.       For up to date guidelines please visit www.cdc.gov  If you have any questions you may call [facility] line [phone]    What advice should be given to patients with known or presumed COVID-19 managed at home?    For most patients with COVID-19 who are managed at home, we advise the following:    ?Supportive care with antipyretics/analgesics (eg, acetaminophen) and hydration    ?Close contact with their health care provider    ?Monitoring for clinical worsening, particularly the development of new or worsening dyspnea, which should prompt clinical evaluation and possible hospitalization    ?Separation from other household members, including pets (eg, staying in a separate room when possible and wearing a mask when in the same room)    ?Frequent hand washing for all family members    ?Frequent disinfection of commonly touched surfaces    Some patients with cough or dyspnea may experience symptomatic improvement with self-proning (resting in the prone rather than the supine position)    All other care is generally supportive, similar to that advised for other acute viral illnesses:    ?We advise that patients stay well hydrated, particularly those patients with sustained or higher fevers, in whom insensible fluid losses may be significant.    ?Cough that is persistent, interferes with sleep, or causes discomfort can be managed with an over-the-counter cough medication (eg, dextromethorphan) or prescription benzonatate, 100 to 200 mg orally three times daily as needed.    ?We advise rest as needed during the acute illness; for patients without hypoxia, frequent repositioning and ambulation is encouraged. In  addition, we encourage all patients to advance activity as soon as tolerated during recovery.      Additional instructions:  Separate yourself from other people and animals in your home.  Call ahead before visiting your doctor.  Wear a facemask when around others.  Cover your coughs and sneezes.  Wash your hands often with soap and water; hand  can be used, too.  Avoid sharing personal household items.  Wipe down surfaces used daily.  Monitor your symptoms. Seek prompt medical attention if your illness is worsening (e.g., difficulty breathing).   Before seeking care, call your healthcare provider.  If you have a medical emergency and need to call 911, notify the dispatch personnel that you have, or are being evaluated for COVID-19. If possible, put on a facemask before emergency medical services arrive.      Contact Tracing for patients who have been vaccinated and agree to sequencing.    As one of the next steps, you will receive a call or text from the [place] [facility] ([facility]) COVID-19 Tracing Team. See the contact information below so you know not to ignore the health departments call. It is important that you contact them back immediately so they can help.      Contact Tracer Number:  [phone]  Caller ID for most carriers: [facility]     What is contact tracing?  Contact tracing is a process that helps identify everyone who has been in close contact with an infected person. Contact tracers let those people know they may have been exposed and guide them on next steps. Confidentiality is important for everyone; no one will be told who may have exposed them to the virus.  Your involvement is important. The more we know about where and how this virus is spreading, the better chance we have at stopping it from spreading further.  What does exposure mean?  Exposure means you have been within 6 feet for more than 15 minutes with a person who has or had COVID-19.  What kind of questions  do the contact tracers ask?  A contact tracer will confirm your basic contact information including name, address, phone number, and next of kin, as well as asking about any symptoms you may have had. Theyll also ask you how you think you may have gotten sick, such as places where you may have been exposed to the virus, and people you were with. Those names will never be shared with anyone outside of that call, and will only be used to help trace and stop the spread of the virus.   I have privacy concerns. How will the state use my information?  Your privacy about your health is important. All calls are completed using call centers that use the appropriate health privacy protection measures (HIPAA compliance), meaning that your patient information is safe. No one will ever ask you any questions related to immigration status. Your health comes first.   Do I have to participate?  You do not have to participate, but we strongly encourage you to. Contact tracing can help us catch and control new outbreaks as theyre developing to keep your friends and family safe.   What if I dont hear from anyone?  If you dont receive a call within 24 hours, you can call the number above right away to inquire about your status. That line is open from 8:00 am - 8:00 p.m., 7 days a week.  Contact tracing saves lives! Together, we have the power to beat this virus and keep our loved ones and neighbors safe.    For more information see CDC link below.      https://www.cdc.gov/coronavirus/2019-ncov/hcp/guidance-prevent-spread.html#precautions        Sources:  [facility], [place] [facility] and [facility]      Additional MDM:     Heart Failure Score:   COPD = No

## 2023-11-14 NOTE — PATIENT INSTRUCTIONS
POSITIVE COVID TEST:  Discussed side effects of Paxlovid; patient verbalized her understanding.      You have tested positive for COVID-19 today.           ISOLATION    If you tested positive and do not have symptoms, you must isolate for 5 days starting on the day of the positive test. I         If you tested positive and have symptoms, you must isolate for 5 days starting on the day of the first symptoms,  not the day of the positive test.         This is the most important part, both the CDC and the [facility] emphasize that you do not test out of isolation.         After 5 days, if your symptoms have improved and you have not had fever on day 5, you can return to the community on day 6- NO TESTING REQUIRED!          In fact, we do not retest if you were positive in the last 90 days.         After your 5 days of isolation are completed, the CDC recommends strict mask use for the first 5 days that you come out of isolation.      When to Seek Emergency Medical Attention  Look for emergency warning signs* for COVID-19. If someone is showing any of these signs, seek emergency medical care immediately:    Trouble breathing  Persistent pain or pressure in the chest  New confusion  Inability to wake or stay awake  Pale, gray, or blue-colored skin, lips, or nail beds, depending on skin tone  *This list is not all possible symptoms. Please call your medical provider for any other symptoms that are severe or concerning to you.    Call 911 or call ahead to your local emergency facility: Notify the  that you are seeking care for someone who has or may have COVID-19.       For up to date guidelines please visit www.cdc.gov  If you have any questions you may call [facility] line [phone]    What advice should be given to patients with known or presumed COVID-19 managed at home?    For most patients with COVID-19 who are managed at home, we advise the following:    ?Supportive care with  antipyretics/analgesics (eg, acetaminophen) and hydration    ?Close contact with their health care provider    ?Monitoring for clinical worsening, particularly the development of new or worsening dyspnea, which should prompt clinical evaluation and possible hospitalization    ?Separation from other household members, including pets (eg, staying in a separate room when possible and wearing a mask when in the same room)    ?Frequent hand washing for all family members    ?Frequent disinfection of commonly touched surfaces    Some patients with cough or dyspnea may experience symptomatic improvement with self-proning (resting in the prone rather than the supine position)    All other care is generally supportive, similar to that advised for other acute viral illnesses:    ?We advise that patients stay well hydrated, particularly those patients with sustained or higher fevers, in whom insensible fluid losses may be significant.    ?Cough that is persistent, interferes with sleep, or causes discomfort can be managed with an over-the-counter cough medication (eg, dextromethorphan) or prescription benzonatate, 100 to 200 mg orally three times daily as needed.    ?We advise rest as needed during the acute illness; for patients without hypoxia, frequent repositioning and ambulation is encouraged. In addition, we encourage all patients to advance activity as soon as tolerated during recovery.      Additional instructions:  Separate yourself from other people and animals in your home.  Call ahead before visiting your doctor.  Wear a facemask when around others.  Cover your coughs and sneezes.  Wash your hands often with soap and water; hand  can be used, too.  Avoid sharing personal household items.  Wipe down surfaces used daily.  Monitor your symptoms. Seek prompt medical attention if your illness is worsening (e.g., difficulty breathing).   Before seeking care, call your healthcare provider.  If you have a medical  emergency and need to call 911, notify the dispatch personnel that you have, or are being evaluated for COVID-19. If possible, put on a facemask before emergency medical services arrive.      Contact Tracing for patients who have been vaccinated and agree to sequencing.    As one of the next steps, you will receive a call or text from the [place] Department of Health ([facility]) COVID-19 Tracing Team. See the contact information below so you know not to ignore the health departments call. It is important that you contact them back immediately so they can help.      Contact Tracer Number:  [phone]  Caller ID for most carriers: [place] Dept Health     What is contact tracing?  Contact tracing is a process that helps identify everyone who has been in close contact with an infected person. Contact tracers let those people know they may have been exposed and guide them on next steps. Confidentiality is important for everyone; no one will be told who may have exposed them to the virus.  Your involvement is important. The more we know about where and how this virus is spreading, the better chance we have at stopping it from spreading further.  What does exposure mean?  Exposure means you have been within 6 feet for more than 15 minutes with a person who has or had COVID-19.  What kind of questions do the contact tracers ask?  A contact tracer will confirm your basic contact information including name, address, phone number, and next of kin, as well as asking about any symptoms you may have had. Theyll also ask you how you think you may have gotten sick, such as places where you may have been exposed to the virus, and people you were with. Those names will never be shared with anyone outside of that call, and will only be used to help trace and stop the spread of the virus.   I have privacy concerns. How will the state use my information?  Your privacy about your health is important. All calls are completed using call  centers that use the appropriate health privacy protection measures (HIPAA compliance), meaning that your patient information is safe. No one will ever ask you any questions related to immigration status. Your health comes first.   Do I have to participate?  You do not have to participate, but we strongly encourage you to. Contact tracing can help us catch and control new outbreaks as theyre developing to keep your friends and family safe.   What if I dont hear from anyone?  If you dont receive a call within 24 hours, you can call the number above right away to inquire about your status. That line is open from 8:00 am - 8:00 p.m., 7 days a week.  Contact tracing saves lives! Together, we have the power to beat this virus and keep our loved ones and neighbors safe.    For more information see CDC link below.      https://www.cdc.gov/coronavirus/2019-ncov/hcp/guidance-prevent-spread.html#precautions        Sources:  [facility], [facility] and [facility]   You must understand that you've received an Urgent Care treatment only and that you may be released before all your medical problems are known or treated. You, the patient, will arrange for follow up care as instructed.  Follow up with your PCP or specialty clinic as directed in the next 1-2 weeks if not improved or as needed.  You can call [phone] to schedule an appointment with the appropriate provider.  If your condition worsens we recommend that you receive another evaluation at the emergency room immediately or contact your primary medical clinics after hours call service to discuss your concerns.  Please return here or go to the Emergency Department for any concerns or worsening of condition.    If you were prescribed a narcotic or controlled medication, do not drive or operate heavy equipment or machinery while taking these medications.    Thank you for choosing [facility]!    Our goal in the Urgent Care is to always provide  outstanding medical care. You may receive a survey by mail or e-mail in the next week regarding your experience today. We would greatly appreciate you completing and returning the survey. Your feedback provides us with a way to recognize our staff who provide very good care, and it helps us learn how to improve when your experience was below our aspiration of excellence.      We appreciate you trusting us with your medical care. We hope you feel better soon. We will be happy to take care of you for all of your future medical needs.    Sincerely,    Isaiah White DNP, FNP-C

## 2023-11-16 ENCOUNTER — PATIENT MESSAGE (OUTPATIENT)
Dept: RESEARCH | Facility: HOSPITAL | Age: 63
End: 2023-11-16
Payer: COMMERCIAL

## 2023-12-29 ENCOUNTER — OFFICE VISIT (OUTPATIENT)
Dept: URGENT CARE | Facility: CLINIC | Age: 63
End: 2023-12-29
Payer: COMMERCIAL

## 2023-12-29 VITALS
HEART RATE: 111 BPM | SYSTOLIC BLOOD PRESSURE: 113 MMHG | HEIGHT: 60 IN | WEIGHT: 112 LBS | RESPIRATION RATE: 21 BRPM | TEMPERATURE: 99 F | BODY MASS INDEX: 21.99 KG/M2 | DIASTOLIC BLOOD PRESSURE: 69 MMHG | OXYGEN SATURATION: 98 %

## 2023-12-29 DIAGNOSIS — J10.1 INFLUENZA A: Primary | ICD-10-CM

## 2023-12-29 DIAGNOSIS — R05.1 ACUTE COUGH: ICD-10-CM

## 2023-12-29 LAB
CTP QC/QA: YES
POC MOLECULAR INFLUENZA A AGN: POSITIVE
POC MOLECULAR INFLUENZA B AGN: NEGATIVE

## 2023-12-29 PROCEDURE — 99213 PR OFFICE/OUTPT VISIT, EST, LEVL III, 20-29 MIN: ICD-10-PCS | Mod: S$GLB,,, | Performed by: NURSE PRACTITIONER

## 2023-12-29 PROCEDURE — 99213 OFFICE O/P EST LOW 20 MIN: CPT | Mod: S$GLB,,, | Performed by: NURSE PRACTITIONER

## 2023-12-29 PROCEDURE — 87502 INFLUENZA DNA AMP PROBE: CPT | Mod: QW,S$GLB,, | Performed by: NURSE PRACTITIONER

## 2023-12-29 PROCEDURE — 87502 POCT INFLUENZA A/B MOLECULAR: ICD-10-PCS | Mod: QW,S$GLB,, | Performed by: NURSE PRACTITIONER

## 2023-12-29 RX ORDER — PROMETHAZINE HYDROCHLORIDE AND DEXTROMETHORPHAN HYDROBROMIDE 6.25; 15 MG/5ML; MG/5ML
5 SYRUP ORAL EVERY 8 HOURS PRN
Qty: 100 ML | Refills: 0 | Status: SHIPPED | OUTPATIENT
Start: 2023-12-29 | End: 2024-01-05

## 2023-12-29 RX ORDER — OSELTAMIVIR PHOSPHATE 75 MG/1
75 CAPSULE ORAL 2 TIMES DAILY
Qty: 10 CAPSULE | Refills: 0 | Status: SHIPPED | OUTPATIENT
Start: 2023-12-29 | End: 2024-01-03

## 2023-12-29 NOTE — PROGRESS NOTES
Subjective:      Patient ID: Jay Allison is a 63 y.o. female.    Vitals:  height is 5' (1.524 m) and weight is 50.8 kg (112 lb). Her oral temperature is 98.5 °F (36.9 °C). Her blood pressure is 113/69 and her pulse is 111 (abnormal). Her respiration is 21 (abnormal) and oxygen saturation is 98%.     Chief Complaint: Cough    62 y/o female presents today with a complaint of chills, non-productive cough, and headaches.  Onset of symptoms, yesterday.  Reports Influenza exposure.  History of Covid one month ago.    Cough  This is a new problem. The current episode started yesterday. The problem has been gradually worsening. The problem occurs every few minutes. The cough is Productive of sputum. Associated symptoms include chills, headaches, myalgias, postnasal drip and shortness of breath. Pertinent negatives include no chest pain, ear congestion, ear pain, fever, heartburn, hemoptysis, nasal congestion, rash, rhinorrhea, sore throat, sweats, weight loss or wheezing. Associated symptoms comments: Fatigue. Exacerbated by: Talking. Treatments tried: Tylenol, Allergy med. The treatment provided no relief. There is no history of asthma, bronchiectasis, bronchitis, COPD, emphysema, environmental allergies or pneumonia.       Constitution: Positive for chills. Negative for fever and generalized weakness.   HENT:  Positive for postnasal drip. Negative for ear pain and sore throat.    Cardiovascular:  Negative for chest pain and palpitations.   Respiratory:  Positive for cough and shortness of breath. Negative for chest tightness, sputum production, bloody sputum, stridor, wheezing and asthma.    Gastrointestinal:  Negative for heartburn.   Musculoskeletal:  Positive for muscle ache.   Skin:  Negative for rash.   Allergic/Immunologic: Negative for environmental allergies and asthma.   Neurological:  Positive for headaches. Negative for history of migraines.      Objective:     Physical Exam   Constitutional: She is  oriented to person, place, and time. She appears well-developed. She is cooperative.  Non-toxic appearance. She does not appear ill. No distress.   HENT:   Head: Normocephalic and atraumatic.   Ears:   Right Ear: Hearing, tympanic membrane, external ear and ear canal normal. impacted cerumen  Left Ear: Hearing, tympanic membrane, external ear and ear canal normal. impacted cerumen  Nose: Rhinorrhea and congestion present. No mucosal edema or nasal deformity. No epistaxis. Right sinus exhibits no maxillary sinus tenderness and no frontal sinus tenderness. Left sinus exhibits no maxillary sinus tenderness and no frontal sinus tenderness.   Mouth/Throat: Uvula is midline, oropharynx is clear and moist and mucous membranes are normal. No trismus in the jaw. Normal dentition. No uvula swelling. No oropharyngeal exudate, posterior oropharyngeal edema or posterior oropharyngeal erythema.   Eyes: Conjunctivae and lids are normal. No scleral icterus.   Neck: Trachea normal and phonation normal. Neck supple. No edema present. No erythema present. No neck rigidity present.   Cardiovascular: Normal rate, regular rhythm, normal heart sounds and normal pulses.   Pulmonary/Chest: Effort normal and breath sounds normal. No stridor. No respiratory distress. She has no decreased breath sounds. She has no wheezes. She has no rhonchi. She has no rales. She exhibits no tenderness.   Abdominal: Normal appearance.   Musculoskeletal: Normal range of motion.         General: No deformity. Normal range of motion.   Neurological: She is alert and oriented to person, place, and time. She exhibits normal muscle tone. Coordination normal.   Skin: Skin is warm, dry, intact, not diaphoretic and not pale.   Psychiatric: Her speech is normal and behavior is normal. Judgment and thought content normal.   Nursing note and vitals reviewed.      Assessment:     1. Influenza A    2. Acute cough      Results for orders placed or performed in visit on  12/29/23   POCT Influenza A/B MOLECULAR   Result Value Ref Range    POC Molecular Influenza A Ag Positive (A) Negative, Not Reported    POC Molecular Influenza B Ag Negative Negative, Not Reported     Acceptable Yes         Plan:     Influenza A  -     oseltamivir (TAMIFLU) 75 MG capsule; Take 1 capsule (75 mg total) by mouth 2 (two) times daily. for 5 days  Dispense: 10 capsule; Refill: 0    Acute cough  -     POCT Influenza A/B MOLECULAR  -     promethazine-dextromethorphan (PROMETHAZINE-DM) 6.25-15 mg/5 mL Syrp; Take 5 mLs by mouth every 8 (eight) hours as needed (cough).  Dispense: 100 mL; Refill: 0          FLU  Your Rapid FLU test was POSITIVE FOR INFLUENZA A  If your condition worsens or fails to improve we recommend that you receive another evaluation at the ER immediately or contact your PCP to discuss your concerns or return here. You must understand that you've received an urgent care treatment only and that you may be released before all your medical problems are known or treated. You the patient will arrange for follouw care as instructed.   -  Take full course of Tamilfu as prescribed  -  Flonase (fluticasone) is a nasal spray which is available over the counter and may help with your symptoms.   -  Zyrtec D, Claritin D or Allegra D can also help with symptoms of congestion and drainage.   -  If you just have drainage you can take plain Zyrtec, Claritin or Allegra   -  Rest and fluids are also important.   -  Tylenol or ibuprofen can also be used as directed for pain unless you have an allergy to them or medical condition such as stomach ulcers, kidney or liver disease or blood thinners etc for which you should not be taking these type of medications.   - Do not share any utensils or share drinks   - Wash hands frequently

## 2023-12-29 NOTE — PATIENT INSTRUCTIONS
FLU  Your Rapid FLU test was POSITIVE FOR INFLUENZA A  If your condition worsens or fails to improve we recommend that you receive another evaluation at the ER immediately or contact your PCP to discuss your concerns or return here. You must understand that you've received an urgent care treatment only and that you may be released before all your medical problems are known or treated. You the patient will arrange for follouwp care as instructed.   -  Take full course of Tamilfu as prescribed  -  Flonase (fluticasone) is a nasal spray which is available over the counter and may help with your symptoms.   -  Zyrtec D, Claritin D or Allegra D can also help with symptoms of congestion and drainage.   -  If you just have drainage you can take plain Zyrtec, Claritin or Allegra   -  Rest and fluids are also important.   -  Tylenol or ibuprofen can also be used as directed for pain unless you have an allergy to them or medical condition such as stomach ulcers, kidney or liver disease or blood thinners etc for which you should not be taking these type of medications.   - Do not share any utensils or share drinks   - Wash hands frequently     You must understand that you've received an Urgent Care treatment only and that you may be released before all your medical problems are known or treated. You, the patient, will arrange for follow up care as instructed.  Follow up with your PCP or specialty clinic as directed in the next 1-2 weeks if not improved or as needed.  You can call [phone] to schedule an appointment with the appropriate provider.  If your condition worsens we recommend that you receive another evaluation at the emergency room immediately or contact your primary medical clinics after hours call service to discuss your concerns.  Please return here or go to the Emergency Department for any concerns or worsening of condition.    If you were prescribed a narcotic or controlled medication, do not drive or  operate heavy equipment or machinery while taking these medications.    Thank you for choosing [facility]!    Our goal in the Urgent Care is to always provide outstanding medical care. You may receive a survey by mail or e-mail in the next week regarding your experience today. We would greatly appreciate you completing and returning the survey. Your feedback provides us with a way to recognize our staff who provide very good care, and it helps us learn how to improve when your experience was below our aspiration of excellence.      We appreciate you trusting us with your medical care. We hope you feel better soon. We will be happy to take care of you for all of your future medical needs.   This note was prepared using voice-recognition software.  Although efforts are made to proofread the note, some errors may persist in the final document.     Sincerely,    Isaiah White DNP, FNP-C

## 2024-06-03 ENCOUNTER — TELEPHONE (OUTPATIENT)
Dept: OPHTHALMOLOGY | Facility: CLINIC | Age: 64
End: 2024-06-03
Payer: COMMERCIAL

## 2024-06-03 NOTE — TELEPHONE ENCOUNTER
----- Message from Ely Monge sent at 6/3/2024  8:15 AM CDT -----  Regarding: Swollen Eye  Patient called in regards to having a swollen right eye for 3 days now.    Please call back to further assist- [phone]

## 2024-08-16 ENCOUNTER — OFFICE VISIT (OUTPATIENT)
Dept: FAMILY MEDICINE | Facility: CLINIC | Age: 64
End: 2024-08-16
Attending: FAMILY MEDICINE
Payer: COMMERCIAL

## 2024-08-16 VITALS
HEART RATE: 64 BPM | HEIGHT: 60 IN | BODY MASS INDEX: 21.12 KG/M2 | DIASTOLIC BLOOD PRESSURE: 66 MMHG | SYSTOLIC BLOOD PRESSURE: 114 MMHG | OXYGEN SATURATION: 98 % | WEIGHT: 107.56 LBS

## 2024-08-16 DIAGNOSIS — Z12.31 ENCOUNTER FOR SCREENING MAMMOGRAM FOR BREAST CANCER: ICD-10-CM

## 2024-08-16 DIAGNOSIS — Z00.00 ROUTINE GENERAL MEDICAL EXAMINATION AT HEALTH CARE FACILITY: Primary | ICD-10-CM

## 2024-08-16 DIAGNOSIS — Z01.818 PRE-OP EXAM: ICD-10-CM

## 2024-08-16 DIAGNOSIS — Z12.11 SCREEN FOR COLON CANCER: ICD-10-CM

## 2024-08-16 PROCEDURE — 99999 PR PBB SHADOW E&M-EST. PATIENT-LVL IV: CPT | Mod: PBBFAC,,, | Performed by: FAMILY MEDICINE

## 2024-08-16 NOTE — PROGRESS NOTES
Subjective     Patient ID: Jay Allison is a 63 y.o. female.    Chief Complaint: Pre-op Exam    63 yr old pleasant female with no significant medical history presents today as new patient to me and establishing primary care and her pre op clearance for cataract surgery and annual exam and lab work. No complaints today.    No cardiovascular symptoms, she exercises regularly. She eats healthy.    History as below - reviewed       Review of Systems   Constitutional: Negative.  Negative for activity change, diaphoresis and unexpected weight change.   HENT: Negative.  Negative for nasal congestion, ear discharge, hearing loss, rhinorrhea, sore throat and voice change.    Eyes: Negative.  Negative for pain, discharge and visual disturbance.   Respiratory: Negative.  Negative for chest tightness, shortness of breath and wheezing.    Cardiovascular: Negative.  Negative for chest pain.   Gastrointestinal: Negative.  Negative for abdominal distention, anal bleeding, constipation and nausea.   Endocrine: Negative.  Negative for cold intolerance, polydipsia and polyuria.   Genitourinary: Negative.  Negative for decreased urine volume, difficulty urinating, dysuria, frequency, menstrual problem and vaginal pain.   Musculoskeletal: Negative.  Negative for arthralgias, gait problem and myalgias.   Integumentary:  Negative for color change, pallor and wound. Negative.   Allergic/Immunologic: Negative.  Negative for environmental allergies and immunocompromised state.   Neurological: Negative.  Negative for dizziness, tremors, seizures, speech difficulty and headaches.   Hematological: Negative.  Negative for adenopathy. Does not bruise/bleed easily.   Psychiatric/Behavioral: Negative.  Negative for agitation, confusion, decreased concentration, hallucinations, self-injury and suicidal ideas. The patient is not nervous/anxious.      Past Medical History:   Diagnosis Date    Cortical cataract of both eyes 4/20/2021       Past  Surgical History:   Procedure Laterality Date    HYSTERECTOMY         Family History   Problem Relation Name Age of Onset    Cancer Father      Diabetes Mother      No Known Problems Sister      No Known Problems Brother      No Known Problems Maternal Aunt      No Known Problems Maternal Uncle      No Known Problems Paternal Aunt      No Known Problems Paternal Uncle      No Known Problems Maternal Grandmother      No Known Problems Maternal Grandfather      No Known Problems Paternal Grandmother      No Known Problems Paternal Grandfather      Amblyopia Neg Hx      Blindness Neg Hx      Cataracts Neg Hx      Glaucoma Neg Hx      Hypertension Neg Hx      Macular degeneration Neg Hx      Retinal detachment Neg Hx      Strabismus Neg Hx      Stroke Neg Hx      Thyroid disease Neg Hx         Social History     Socioeconomic History    Marital status:    Tobacco Use    Smoking status: Never    Smokeless tobacco: Never   Substance and Sexual Activity    Alcohol use: Yes    Drug use: No    Sexual activity: Yes     Social Determinants of Health     Financial Resource Strain: Low Risk  (8/14/2024)    Overall Financial Resource Strain (CARDIA)     Difficulty of Paying Living Expenses: Not hard at all   Food Insecurity: No Food Insecurity (8/14/2024)    Hunger Vital Sign     Worried About Running Out of Food in the Last Year: Never true     Ran Out of Food in the Last Year: Never true   Physical Activity: Sufficiently Active (8/14/2024)    Exercise Vital Sign     Days of Exercise per Week: 7 days     Minutes of Exercise per Session: 40 min   Stress: No Stress Concern Present (8/14/2024)    [employer] - Occupational Stress Questionnaire     Feeling of Stress : Not at all   Housing Stability: Unknown (8/14/2024)    Housing Stability Vital Sign     Unable to Pay for Housing in the Last Year: No       Current Outpatient Medications   Medication Sig Dispense Refill    fluticasone propionate  (FLONASE) 50 mcg/actuation nasal spray 1 spray (50 mcg total) by Each Nostril route once daily. (Patient not taking: Reported on 8/16/2024) 9.9 mL 0     No current facility-administered medications for this visit.       Review of patient's allergies indicates:  No Known Allergies       Objective   Vitals:    08/16/24 0827   BP: 114/66   Pulse: 64       Physical Exam  Constitutional:       General: She is not in acute distress.     Appearance: She is well-developed. She is not diaphoretic.   HENT:      Head: Normocephalic and atraumatic.      Right Ear: External ear normal.      Left Ear: External ear normal.      Nose: Nose normal.      Mouth/Throat:      Pharynx: No oropharyngeal exudate.   Eyes:      General: No scleral icterus.        Right eye: No discharge.         Left eye: No discharge.      Conjunctiva/sclera: Conjunctivae normal.      Pupils: Pupils are equal, round, and reactive to light.   Neck:      Thyroid: No thyromegaly.      Vascular: No JVD.      Trachea: No tracheal deviation.   Cardiovascular:      Rate and Rhythm: Normal rate and regular rhythm.      Heart sounds: Normal heart sounds. No murmur heard.     No friction rub. No gallop.   Pulmonary:      Effort: Pulmonary effort is normal.      Breath sounds: Normal breath sounds. No stridor. No wheezing or rales.   Chest:      Chest wall: No tenderness.   Abdominal:      General: Bowel sounds are normal. There is no distension.      Palpations: Abdomen is soft. There is no mass.      Tenderness: There is no abdominal tenderness. There is no guarding or rebound.      Hernia: No hernia is present.   Musculoskeletal:         General: No tenderness. Normal range of motion.      Cervical back: Normal range of motion and neck supple.   Lymphadenopathy:      Cervical: No cervical adenopathy.   Skin:     General: Skin is warm and dry.      Coloration: Skin is not pale.      Findings: No erythema or rash.   Neurological:      Mental Status: She is alert and  oriented to person, place, and time.      Cranial Nerves: No cranial nerve deficit.      Motor: No abnormal muscle tone.      Coordination: Coordination normal.      Deep Tendon Reflexes: Reflexes are normal and symmetric. Reflexes normal.   Psychiatric:         Behavior: Behavior normal.         Thought Content: Thought content normal.         Judgment: Judgment normal.            Assessment and Plan     1. Routine general medical examination at health care facility  -     CBC Auto Differential; Future; Expected date: 08/16/2024  -     Comprehensive Metabolic Panel; Future; Expected date: 08/16/2024  -     Lipid Panel; Future; Expected date: 08/16/2024    2. Pre-op exam  -     CBC Auto Differential; Future; Expected date: 08/16/2024  -     Comprehensive Metabolic Panel; Future; Expected date: 08/16/2024  -     Lipid Panel; Future; Expected date: 08/16/2024    3. Screen for colon cancer  -     Ambulatory referral/consult to Endo Procedure ; Future; Expected date: 08/17/2024    4. Encounter for screening mammogram for breast cancer  -     Mammo Digital Screening Bilat w/ Magdiel; Future; Expected date: 08/16/2024        Wellness check  -pre op exam  -normal  -labs    ACC/AHA 2007 Guidelines for clearance    Step 1: No need for emergency non cardiac surgery  Step 2: No active cardiac conditions  Step 3: No low risk surgery  Step4: Yes - Functional capacity greater than or equal to 4 METs without symptoms - YES - Class IIa, RACHEL B - Proceed with surgery    She is Low to intermediate risk for surgery    Mammo and colon screen ordered     Spent adequate time in obtaining history and explaining differentials               Follow up in about 1 year (around 8/16/2025), or if symptoms worsen or fail to improve.

## 2024-08-17 ENCOUNTER — OFFICE VISIT (OUTPATIENT)
Dept: URGENT CARE | Facility: CLINIC | Age: 64
End: 2024-08-17
Payer: COMMERCIAL

## 2024-08-17 VITALS
BODY MASS INDEX: 21.12 KG/M2 | OXYGEN SATURATION: 98 % | TEMPERATURE: 99 F | RESPIRATION RATE: 16 BRPM | HEIGHT: 60 IN | HEART RATE: 83 BPM | WEIGHT: 107.56 LBS | SYSTOLIC BLOOD PRESSURE: 116 MMHG | DIASTOLIC BLOOD PRESSURE: 78 MMHG

## 2024-08-17 DIAGNOSIS — H65.02 NON-RECURRENT ACUTE SEROUS OTITIS MEDIA OF LEFT EAR: Primary | ICD-10-CM

## 2024-08-17 PROCEDURE — 96372 THER/PROPH/DIAG INJ SC/IM: CPT | Mod: S$GLB,,, | Performed by: FAMILY MEDICINE

## 2024-08-17 PROCEDURE — 99214 OFFICE O/P EST MOD 30 MIN: CPT | Mod: 25,S$GLB,, | Performed by: FAMILY MEDICINE

## 2024-08-17 RX ORDER — BETAMETHASONE SODIUM PHOSPHATE AND BETAMETHASONE ACETATE 3; 3 MG/ML; MG/ML
6 INJECTION, SUSPENSION INTRA-ARTICULAR; INTRALESIONAL; INTRAMUSCULAR; SOFT TISSUE
Status: COMPLETED | OUTPATIENT
Start: 2024-08-17 | End: 2024-08-17

## 2024-08-17 RX ADMIN — BETAMETHASONE SODIUM PHOSPHATE AND BETAMETHASONE ACETATE 6 MG: 3; 3 INJECTION, SUSPENSION INTRA-ARTICULAR; INTRALESIONAL; INTRAMUSCULAR; SOFT TISSUE at 01:08

## 2024-08-17 NOTE — PROGRESS NOTES
Subjective:      Patient ID: Jay Allison is a 63 y.o. female.    Vitals:  height is 5' (1.524 m) and weight is 48.8 kg (107 lb 9.4 oz). Her oral temperature is 99 °F (37.2 °C). Her blood pressure is 116/78 and her pulse is 83. Her respiration is 16 and oxygen saturation is 98%.     Chief Complaint: Otalgia    Pt complains of an earache and feeling like she's underwater. Also reports a sinus problem.left ear painful and fluid feeling      Otalgia   There is pain in the left ear. This is a new problem. The current episode started yesterday. The problem occurs constantly. The problem has been unchanged. There has been no fever. The fever has been present for Less than 1 day. The pain is at a severity of 6/10. The pain is moderate. She has tried acetaminophen for the symptoms. The treatment provided no relief.       HENT:  Positive for ear pain.       Objective:     Physical Exam   Constitutional: She is oriented to person, place, and time. She appears well-developed. She is cooperative.  Non-toxic appearance. She does not appear ill. No distress.   HENT:   Head: Normocephalic and atraumatic.   Ears:   Right Ear: Hearing, tympanic membrane, external ear and ear canal normal.   Left Ear: Hearing, external ear and ear canal normal.      Comments: Left tm with fluid  No erythema  Right ear canal impacted with cerumen    Nose: Nose normal. No mucosal edema, rhinorrhea or nasal deformity. No epistaxis. Right sinus exhibits no maxillary sinus tenderness and no frontal sinus tenderness. Left sinus exhibits no maxillary sinus tenderness and no frontal sinus tenderness.   Mouth/Throat: Uvula is midline, oropharynx is clear and moist and mucous membranes are normal. Mucous membranes are moist. No trismus in the jaw. Normal dentition. No uvula swelling. No posterior oropharyngeal erythema. Oropharynx is clear.   Eyes: Conjunctivae and lids are normal. Right eye exhibits no discharge. Left eye exhibits no discharge. No  scleral icterus.   Neck: Trachea normal and phonation normal. Neck supple.   Cardiovascular: Normal rate, regular rhythm, normal heart sounds and normal pulses.   Pulmonary/Chest: Effort normal and breath sounds normal. No respiratory distress.   Abdominal: Normal appearance and bowel sounds are normal. She exhibits no distension and no mass. Soft. There is no abdominal tenderness.   Musculoskeletal: Normal range of motion.         General: No deformity. Normal range of motion.   Neurological: She is alert and oriented to person, place, and time. She exhibits normal muscle tone. Coordination normal.   Skin: Skin is warm, dry, intact, not diaphoretic and not pale.   Psychiatric: Her speech is normal and behavior is normal. Judgment and thought content normal.   Nursing note and vitals reviewed.      Assessment:     1. Non-recurrent acute serous otitis media of left ear        Plan:       Non-recurrent acute serous otitis media of left ear    Other orders  -     betamethasone acetate-betamethasone sodium phosphate injection 6 mg      Claritin D 12 one bid    Warm compresses

## 2024-08-26 ENCOUNTER — CLINICAL SUPPORT (OUTPATIENT)
Dept: ENDOSCOPY | Facility: HOSPITAL | Age: 64
End: 2024-08-26
Attending: FAMILY MEDICINE
Payer: COMMERCIAL

## 2024-08-26 ENCOUNTER — TELEPHONE (OUTPATIENT)
Dept: ENDOSCOPY | Facility: HOSPITAL | Age: 64
End: 2024-08-26

## 2024-08-26 VITALS — WEIGHT: 107 LBS | BODY MASS INDEX: 21.01 KG/M2 | HEIGHT: 60 IN

## 2024-08-26 DIAGNOSIS — Z12.11 SCREEN FOR COLON CANCER: ICD-10-CM

## 2024-08-26 RX ORDER — SODIUM, POTASSIUM,MAG SULFATES 17.5-3.13G
1 SOLUTION, RECONSTITUTED, ORAL ORAL DAILY
Qty: 1 KIT | Refills: 0 | Status: SHIPPED | OUTPATIENT
Start: 2024-08-26 | End: 2024-08-28

## 2024-08-26 NOTE — TELEPHONE ENCOUNTER
Spoke to pt to schedule procedure(s) Colonoscopy       Physician to perform procedure(s) Dr. KAREN Joel  Date of Procedure (s) 10/1/24  Arrival Time 9:30 AM  Time of Procedure(s) 10:30 AM   Location of Procedure(s) [address]   Type of Rx Prep sent to patient: Suprep  Instructions provided to patient via [facility]    Patient was informed on the following information and verbalized understanding. Screening questionnaire reviewed with patient and complete. If procedure requires anesthesia, a responsible adult needs to be present to accompany the patient home, patient cannot drive after receiving anesthesia. Appointment details are tentative, especially check-in time. Patient will receive a prep-op call 7 days prior to confirm check-in time for procedure. If applicable the patient should contact their pharmacy to verify Rx for procedure prep is ready for pick-up. Patient was advised to call the scheduling department at [phone] if pharmacy states no Rx is available. Patient was advised to call the endoscopy scheduling department if any questions or concerns arise.      [facility] Scheduling Department    Patient denies taking any anticoagulants, antiplatelets, GLP-1 medications, or Adipex (Phentermine).

## 2024-09-23 ENCOUNTER — OFFICE VISIT (OUTPATIENT)
Dept: URGENT CARE | Facility: CLINIC | Age: 64
End: 2024-09-23
Payer: COMMERCIAL

## 2024-09-23 VITALS
TEMPERATURE: 99 F | BODY MASS INDEX: 21.88 KG/M2 | HEART RATE: 96 BPM | DIASTOLIC BLOOD PRESSURE: 84 MMHG | SYSTOLIC BLOOD PRESSURE: 130 MMHG | HEIGHT: 60 IN | OXYGEN SATURATION: 97 % | RESPIRATION RATE: 18 BRPM | WEIGHT: 111.44 LBS

## 2024-09-23 DIAGNOSIS — U07.1 COVID-19: Primary | ICD-10-CM

## 2024-09-23 DIAGNOSIS — R05.9 COUGH, UNSPECIFIED TYPE: ICD-10-CM

## 2024-09-23 LAB
CTP QC/QA: YES
SARS-COV-2 AG RESP QL IA.RAPID: POSITIVE

## 2024-09-23 PROCEDURE — 87811 SARS-COV-2 COVID19 W/OPTIC: CPT | Mod: QW,S$GLB,, | Performed by: PHYSICIAN ASSISTANT

## 2024-09-23 PROCEDURE — 99213 OFFICE O/P EST LOW 20 MIN: CPT | Mod: S$GLB,,, | Performed by: PHYSICIAN ASSISTANT

## 2024-09-23 RX ORDER — BROMPHENIRAMINE MALEATE, PSEUDOEPHEDRINE HYDROCHLORIDE, AND DEXTROMETHORPHAN HYDROBROMIDE 2; 30; 10 MG/5ML; MG/5ML; MG/5ML
10 SYRUP ORAL EVERY 6 HOURS PRN
Qty: 118 ML | Refills: 0 | Status: SHIPPED | OUTPATIENT
Start: 2024-09-23 | End: 2024-10-03

## 2024-09-23 NOTE — PROGRESS NOTES
Subjective:      Patient ID: Jay Allison is a 63 y.o. female.    Vitals:  height is 5' (1.524 m) and weight is 50.5 kg (111 lb 7.1 oz). Her oral temperature is 99.4 °F (37.4 °C). Her blood pressure is 130/84 and her pulse is 96. Her respiration is 18 and oxygen saturation is 97%.     Chief Complaint: Cough    Pt complains of body aches, fever, sinus pressure and cough. At home covid test was positive.    Patient provider note starts here:  Presents to the clinic with complaints of flu-like symptoms for the past day.  Reports body aches, low-grade fever, sinus pressure nasal congestion and headache.  Reports that she took a home COVID test which was positive. Denies chest pain or SOB. Taking Mucinex D for her symptoms.     Cough  This is a new problem. The current episode started yesterday. The problem has been gradually worsening. The problem occurs every few minutes. The cough is Non-productive. Associated symptoms include chills, a fever, headaches, myalgias, nasal congestion and postnasal drip. Pertinent negatives include no chest pain, ear congestion, ear pain, rash or wheezing. She has tried OTC cough suppressant for the symptoms. The treatment provided mild relief.       Constitution: Positive for chills, fatigue and fever.   HENT:  Positive for congestion and postnasal drip. Negative for ear pain.    Neck: Negative for neck pain.   Cardiovascular:  Negative for chest pain, palpitations and sob on exertion.   Respiratory:  Positive for cough and sputum production. Negative for chest tightness and wheezing.    Gastrointestinal:  Negative for abdominal pain, vomiting and diarrhea.   Musculoskeletal:  Positive for muscle ache.   Skin:  Negative for color change, rash and wound.   Neurological:  Positive for headaches. Negative for numbness and tingling.      Objective:     Physical Exam   Constitutional: She is oriented to person, place, and time. She appears well-developed. She is cooperative.  Non-toxic  appearance. She appears ill (appears tired). No distress.   HENT:   Head: Normocephalic and atraumatic.   Ears:   Right Ear: Hearing, tympanic membrane, external ear and ear canal normal.   Left Ear: Hearing, tympanic membrane, external ear and ear canal normal.   Nose: Congestion present. No mucosal edema, rhinorrhea or nasal deformity. No epistaxis. Right sinus exhibits no maxillary sinus tenderness and no frontal sinus tenderness. Left sinus exhibits no maxillary sinus tenderness and no frontal sinus tenderness.   Mouth/Throat: Uvula is midline and mucous membranes are normal. No trismus in the jaw. Normal dentition. No uvula swelling. Posterior oropharyngeal erythema present. No oropharyngeal exudate or posterior oropharyngeal edema.   Eyes: Conjunctivae and lids are normal. No scleral icterus.   Neck: Trachea normal and phonation normal. Neck supple. No edema present. No erythema present. No neck rigidity present.   Cardiovascular: Normal rate, regular rhythm, normal heart sounds and normal pulses.   Pulmonary/Chest: Effort normal and breath sounds normal. No respiratory distress. She has no decreased breath sounds. She has no wheezes. She has no rhonchi.   Abdominal: Normal appearance.   Musculoskeletal: Normal range of motion.         General: No deformity. Normal range of motion.   Neurological: She is alert and oriented to person, place, and time. She exhibits normal muscle tone. Coordination normal.   Skin: Skin is warm, dry, intact, not diaphoretic and not pale.   Psychiatric: Her speech is normal and behavior is normal. Judgment and thought content normal.   Nursing note and vitals reviewed.      Assessment:     1. COVID-19    2. Cough, unspecified type      Results for orders placed or performed in visit on 09/23/24   SARS Coronavirus 2 Antigen, POCT Manual Read   Result Value Ref Range    SARS Coronavirus 2 Antigen Negative Negative     Acceptable Yes        Plan:        COVID-19    Cough, unspecified type  -     SARS Coronavirus 2 Antigen, POCT Manual Read    Other orders  -     brompheniramine-pseudoeph-DM (BROMFED DM) 2-30-10 mg/5 mL Syrp; Take 10 mLs by mouth every 6 (six) hours as needed (Cough and congestion).  Dispense: 118 mL; Refill: 0  -     nirmatrelvir-ritonavir 300 mg (150 mg x 2)-100 mg copackaged tablets (EUA); Take 3 tablets by mouth 2 (two) times daily for 5 days. Each dose contains 2 nirmatrelvir (pink tablets) and 1 ritonavir (white tablet). Take all 3 tablets together  Dispense: 30 tablet; Refill: 0          Medical Decision Making:   History:   Old Medical Records: I decided to obtain old medical records.  Clinical Tests:   Lab Tests: Ordered and Reviewed  Urgent Care Management:  A. Problem List:   -Acute: COVID 19    -Chronic: None  B. Differential diagnosis: viral vs bacterial URI, pharyngitis, otitis, COVID 19, influenza, pneumonia  C. Diagnostic Testing Ordered: COVID  D. Diagnostic Testing Considered: None  E. Independent Historians: None  F. Urgent Care Midlevel Independent Results Interpretation: COVID positive   G. Radiology:  H. Review of Previous Medical Records:  I. Home Medications Reviewed  J. Social Determinants of Health considered  K. Medical Decision Making and Disposition:   Patient presents to the clinic with complaints of flu-like symptoms since yesterday.  On exam, she is afebrile and nontoxic in appearance.  Lungs are clear to auscultation bilaterally and vital signs are stable.  Her COVID test is positive.  The risks and benefits of Paxlovid was discussed at length with the patient.  She requested a printed prescription to decide whether or not she would like to fill the Paxlovid.  I prescribed Bromfed DM for symptoms and strongly encouraged close follow-up with primary care.  Isolation requirements and ED precautions discussed as well.  She verbalized understanding and agreed with this plan.           Patient Instructions    Preventing Spread of Respiratory Viruses When You're Sick    When you may have a respiratory virus...  Stay home and away from others *including people you live with who are not sick) if you have respiratory virus symptoms that are not better explained by another cause. These symptoms including fever, chills, fatigue, cough, runny nose, and headache.      * You can go back to your normal activities when, for at least 24 hours, both are true:     - Your symptoms are better overall AND     - You have not had a fever (and are not using fever-reducing medication)    * When you go back to your normal activities, take added precaution over the next 5 days, such as taking additional steps for  air, hygiene, masks, physical distancing, and/or testing when you will be around other people indoors.   - Keep in mind that you may still be able to spread the virus that made you sick, even if you are feeling better. You are likely to be less contagious at this time, depending on factors like how long you were sick or how sick you were.   - If you develop a fever or you start to feel worse after you have gone back to normal activities, stay home and away from other again until, for at least 24 hours, both are true: your symptoms are improving overall, and you have not had a fever (and are not using fever-reducing medications). Then take added precaution for the next 5 days.       If you never had symptoms but tested positive for a respiratory virus...  You may be contagious. For the next 5 days: take added precautions, such as taking additional steps for  air, hygiene, masks, physical distancing, and/or testing when you will be around other people indoors. This is especially important to protect people with factors that increase their risk of severe illness from respiratory viruses.       How it works...  When you have a respiratory virus infection, you can spread it to others. How long someone can spread the virus  depends on different factors, including how sick they are (severity) and how long their illness lasts (duration). This is not the same for everyone.     When, for at least 24 hours, your symptoms are getting better overall and you have not had a fever (and are not using fever-reducing medications), you are typically less contagious, but it still take more time for your body to fully get rid of the virus. During this time, you may still be able to spread the virus to others. Taking precautions for the next 5 days can help reduce this risk. After this 5-day period, you are typically much less likely to be contagious. However, some people, especially people with weakened immune systems, can continue to spread the virus for longer periods of time.     Reference: About Preventing Spread of Respiratory Viruses When Youre Sick  Respiratory Illnesses  CDC

## 2024-09-24 ENCOUNTER — TELEPHONE (OUTPATIENT)
Dept: ENDOSCOPY | Facility: HOSPITAL | Age: 64
End: 2024-09-24
Payer: COMMERCIAL

## 2024-09-24 NOTE — TELEPHONE ENCOUNTER
Spoke to patient to reschedule procedure(s) Colonoscopy       Physician to perform procedure(s) Dr. BEULAH Lynn Murdock  Date of Procedure (s) 10/24/24  Arrival Time 9:00 AM  Time of Procedure(s) 10:00 AM   Location of Procedure(s) [place] [address]   Type of Rx Prep sent to patient: Suprep  Instructions provided to patient via [facility]    Patient was informed on the following information and verbalized understanding. Screening questionnaire reviewed with patient and complete. If procedure requires anesthesia, a responsible adult needs to be present to accompany the patient home, patient cannot drive after receiving anesthesia. Appointment details are tentative, especially check-in time. Patient will receive a prep-op call 7 days prior to confirm check-in time for procedure. If applicable the patient should contact their pharmacy to verify Rx for procedure prep is ready for pick-up. Patient was advised to call the scheduling department at [phone] if pharmacy states no Rx is available. Patient was advised to call the endoscopy scheduling department if any questions or concerns arise.      [facility] Scheduling Department

## 2024-09-26 ENCOUNTER — TELEPHONE (OUTPATIENT)
Dept: FAMILY MEDICINE | Facility: CLINIC | Age: 64
End: 2024-09-26
Payer: COMMERCIAL

## 2024-09-26 NOTE — TELEPHONE ENCOUNTER
Spoke with nixon at eye  , booked for pre op tomorrow at 930 am with Dr Sangeeta Olvera will fax the form and will contact the patient

## 2024-09-26 NOTE — TELEPHONE ENCOUNTER
----- Message from Kenan Jurado sent at 9/26/2024 12:43 PM CDT -----  Contact: pt  Type: Requesting to speak with nurse        Who Called: PT  Regarding: checking status of paperwork she dropped off last week for her eye clinic.   Would the patient rather a call back or a response via [facility]? Call back  Best Call Back Number: [phone]   Additional Information:

## 2024-09-26 NOTE — TELEPHONE ENCOUNTER
----- Message from Leias Eastman sent at 9/25/2024  3:54 PM CDT -----  Regarding: Surgery Clearance  Contact: [phone]  Type:  Needs Medical Advice    Who Called: May chowdhury/[facility]  Would the patient rather a call back or a response via [facility]? Call  Best Call Back Number: [phone]  F# [phone]   Additional Information: Calling in ref to patients surgery clearance letter for cataract surgery scheduled 10/4 with Dr Bey

## 2024-09-27 ENCOUNTER — TELEPHONE (OUTPATIENT)
Dept: FAMILY MEDICINE | Facility: CLINIC | Age: 64
End: 2024-09-27
Payer: COMMERCIAL

## 2024-09-27 NOTE — TELEPHONE ENCOUNTER
----- Message from Mone Purdy sent at 9/27/2024  8:46 AM CDT -----  Type:  Patient Returning Call    Who Called:pt  Who Left Message for Patient:Teresa  Does the patient know what this is regarding?:Paperwork  Would the patient rather a call back or a response via [facility]? call  Best Call Back Number: [phone]  Additional Information:

## 2024-10-17 ENCOUNTER — TELEPHONE (OUTPATIENT)
Dept: ENDOSCOPY | Facility: HOSPITAL | Age: 64
End: 2024-10-17
Payer: COMMERCIAL

## 2024-10-17 NOTE — TELEPHONE ENCOUNTER
Referral for procedure from PAT appointment 8/26/24-Ref Dr. Rutherford      Spoke to pt to reschedule procedure(s) Colonoscopy (due to insurance carrier changing.  Instructed pt to call preservices as soon as she receives new insurance information.  Preservices number provided.  Pt verbalized understanding).       Physician to perform procedure(s) Dr. OCTAVIO Jennings  Date of Procedure (s) 12/6/24  Arrival Time 7:45 AM  Time of Procedure(s) 8:45 AM   Location of Procedure(s) [address]   Type of Rx Prep sent to patient: Suprep  Instructions provided to patient via [facility]    Patient was informed on the following information and verbalized understanding. Screening questionnaire reviewed with patient and complete. If procedure requires anesthesia, a responsible adult needs to be present to accompany the patient home, patient cannot drive after receiving anesthesia. Appointment details are tentative, especially check-in time. Patient will receive a prep-op call 7 days prior to confirm check-in time for procedure. If applicable the patient should contact their pharmacy to verify Rx for procedure prep is ready for pick-up. Patient was advised to call the scheduling department at [phone] if pharmacy states no Rx is available. Patient was advised to call the endoscopy scheduling department if any questions or concerns arise.       Endoscopy Scheduling Department

## 2024-12-04 DIAGNOSIS — Z78.0 MENOPAUSE: ICD-10-CM

## 2025-03-19 ENCOUNTER — OFFICE VISIT (OUTPATIENT)
Dept: DERMATOLOGY | Facility: CLINIC | Age: 65
End: 2025-03-19
Payer: MEDICARE

## 2025-03-19 ENCOUNTER — TELEPHONE (OUTPATIENT)
Dept: DERMATOLOGY | Facility: CLINIC | Age: 65
End: 2025-03-19
Payer: MEDICARE

## 2025-03-19 DIAGNOSIS — D48.5 NEOPLASM OF UNCERTAIN BEHAVIOR OF SKIN: Primary | ICD-10-CM

## 2025-03-19 PROCEDURE — 1160F RVW MEDS BY RX/DR IN RCRD: CPT | Mod: CPTII,95,, | Performed by: STUDENT IN AN ORGANIZED HEALTH CARE EDUCATION/TRAINING PROGRAM

## 2025-03-19 PROCEDURE — 98000 SYNCH AUDIO-VIDEO NEW SF 15: CPT | Mod: 95,,, | Performed by: STUDENT IN AN ORGANIZED HEALTH CARE EDUCATION/TRAINING PROGRAM

## 2025-03-19 PROCEDURE — 1159F MED LIST DOCD IN RCRD: CPT | Mod: CPTII,95,, | Performed by: STUDENT IN AN ORGANIZED HEALTH CARE EDUCATION/TRAINING PROGRAM

## 2025-03-19 NOTE — PROGRESS NOTES
The patient location is: home  The chief complaint leading to consultation is: changing mole    Visit type: audiovisual    Face to Face time with patient: 10mins  10 minutes of total time spent on the encounter, which includes face to face time and non-face to face time preparing to see the patient (eg, review of tests), Obtaining and/or reviewing separately obtained history, Documenting clinical information in the electronic or other health record, Independently interpreting results (not separately reported) and communicating results to the patient/family/caregiver, or Care coordination (not separately reported).         Each patient to whom he or she provides medical services by telemedicine is:  (1) informed of the relationship between the physician and patient and the respective role of any other health care provider with respect to management of the patient; and (2) notified that he or she may decline to receive medical services by telemedicine and may withdraw from such care at any time.    History of Present Illness: The patient presents with chief complaint of a changing mole.  Location: on the buttocks  Duration: present for years, but recently started to change  Signs/Symptoms: changing shape, color and growing in size.  Prior treatments: none      ROS: Otherwise negative    PE: const/neuro - AAOx3, NAD           Skin -       A/P: 1) Neoplasm uncertain behavior of skin - Discussed needing in office visit for further/closer evaluation of the lesion and likely biopsy to r/o melanoma.

## 2025-03-19 NOTE — TELEPHONE ENCOUNTER
Pt scheduled for skin lesion to buttock----- Message from Deonte Humphreys MD sent at 3/19/2025 11:50 AM CDT -----  Good morning, I saw this patient in a virtual visit with a concerning lesion on the buttocks (r/o melanoma). Patient lives over 1.5 hours outside of  (much closer to [facility]) and would have a difficult time making it to  and would like to be seen closer to home. Please let me know if you have any sooner appointments with any provider to be seen. Thanks.

## 2025-03-19 NOTE — Clinical Note
Good morning, I saw this patient in a virtual visit with a concerning lesion on the buttocks (r/o melanoma). Patient lives over 1.5 hours outside of  (much closer to [facility]) and would have a difficult time making it to [place] and would like to be seen closer to home. Please let me know if you have any sooner appointments with any provider to be seen. Thanks.

## 2025-03-25 ENCOUNTER — OFFICE VISIT (OUTPATIENT)
Dept: FAMILY MEDICINE | Facility: CLINIC | Age: 65
End: 2025-03-25
Attending: FAMILY MEDICINE
Payer: MEDICARE

## 2025-03-25 VITALS
BODY MASS INDEX: 23.07 KG/M2 | DIASTOLIC BLOOD PRESSURE: 64 MMHG | WEIGHT: 117.5 LBS | SYSTOLIC BLOOD PRESSURE: 122 MMHG | OXYGEN SATURATION: 99 % | HEART RATE: 89 BPM | HEIGHT: 60 IN

## 2025-03-25 DIAGNOSIS — Z09 FOLLOW-UP EXAM: ICD-10-CM

## 2025-03-25 DIAGNOSIS — Z13.6 ENCOUNTER FOR SCREENING FOR CARDIOVASCULAR DISORDERS: ICD-10-CM

## 2025-03-25 DIAGNOSIS — Z78.0 ASYMPTOMATIC MENOPAUSAL STATE: ICD-10-CM

## 2025-03-25 DIAGNOSIS — Z13.820 ENCOUNTER FOR SCREENING FOR OSTEOPOROSIS: ICD-10-CM

## 2025-03-25 DIAGNOSIS — Z79.899 MEDICATION MANAGEMENT: ICD-10-CM

## 2025-03-25 DIAGNOSIS — Z00.00 ROUTINE GENERAL MEDICAL EXAMINATION AT HEALTH CARE FACILITY: Primary | ICD-10-CM

## 2025-03-25 PROCEDURE — 99999 PR PBB SHADOW E&M-EST. PATIENT-LVL III: CPT | Mod: PBBFAC,,, | Performed by: FAMILY MEDICINE

## 2025-03-25 NOTE — PROGRESS NOTES
Subjective     Patient ID: Jay Allison is a 64 y.o. female.    Chief Complaint: Annual Exam (NOT fasting )    63 yr old pleasant female with no significant medical history presents today as new patient to me and establishing primary care and her pre op clearance for cataract surgery and annual exam and lab work. No complaints today.    No cardiovascular symptoms, she exercises regularly. She eats healthy.    History as below - reviewed       Review of Systems   Constitutional: Negative.  Negative for activity change, diaphoresis and unexpected weight change.   HENT: Negative.  Negative for nasal congestion, ear discharge, hearing loss, rhinorrhea, sore throat and voice change.    Eyes: Negative.  Negative for pain, discharge and visual disturbance.   Respiratory: Negative.  Negative for chest tightness, shortness of breath and wheezing.    Cardiovascular: Negative.  Negative for chest pain.   Gastrointestinal: Negative.  Negative for abdominal distention, anal bleeding, constipation and nausea.   Endocrine: Negative.  Negative for cold intolerance, polydipsia and polyuria.   Genitourinary: Negative.  Negative for decreased urine volume, difficulty urinating, dysuria, frequency, menstrual problem and vaginal pain.   Musculoskeletal: Negative.  Negative for arthralgias, gait problem and myalgias.   Integumentary:  Negative for color change, pallor and wound. Negative.   Allergic/Immunologic: Negative.  Negative for environmental allergies and immunocompromised state.   Neurological: Negative.  Negative for dizziness, tremors, seizures, speech difficulty and headaches.   Hematological: Negative.  Negative for adenopathy. Does not bruise/bleed easily.   Psychiatric/Behavioral: Negative.  Negative for agitation, confusion, decreased concentration, hallucinations, self-injury and suicidal ideas. The patient is not nervous/anxious.      Past Medical History:   Diagnosis Date    Cortical cataract of both eyes  4/20/2021       Past Surgical History:   Procedure Laterality Date    COLONOSCOPY      COLONOSCOPY N/A 12/6/2024    Procedure: COLONOSCOPY;  Surgeon: Chris Jennings MD;  Location: [facility];  Service: Endoscopy;  Laterality: N/A;    EYE SURGERY Bilateral     cataract    HYSTERECTOMY         Family History   Problem Relation Name Age of Onset    Cancer Father      Diabetes Mother      No Known Problems Sister      No Known Problems Brother      No Known Problems Maternal Aunt      No Known Problems Maternal Uncle      No Known Problems Paternal Aunt      No Known Problems Paternal Uncle      No Known Problems Maternal Grandmother      No Known Problems Maternal Grandfather      No Known Problems Paternal Grandmother      No Known Problems Paternal Grandfather      Amblyopia Neg Hx      Blindness Neg Hx      Cataracts Neg Hx      Glaucoma Neg Hx      Hypertension Neg Hx      Macular degeneration Neg Hx      Retinal detachment Neg Hx      Strabismus Neg Hx      Stroke Neg Hx      Thyroid disease Neg Hx         Social History     Socioeconomic History    Marital status:    Tobacco Use    Smoking status: Never     Passive exposure: Never    Smokeless tobacco: Never   Substance and Sexual Activity    Alcohol use: Yes     Comment: occasionally    Drug use: No    Sexual activity: Yes     Social Drivers of Health     Financial Resource Strain: Low Risk  (3/24/2025)    Overall Financial Resource Strain (CARDIA)     Difficulty of Paying Living Expenses: Not hard at all   Food Insecurity: No Food Insecurity (3/24/2025)    Hunger Vital Sign     Worried About Running Out of Food in the Last Year: Never true     Ran Out of Food in the Last Year: Never true   Transportation Needs: No Transportation Needs (3/24/2025)    PRAPARE - Transportation     Lack of Transportation (Medical): No     Lack of Transportation (Non-Medical): No   Physical Activity: Sufficiently Active (3/24/2025)    Exercise Vital Sign     Days of  Exercise per Week: 7 days     Minutes of Exercise per Session: 30 min   Stress: No Stress Concern Present (3/24/2025)    [employer] - Occupational Stress Questionnaire     Feeling of Stress : Not at all   Housing Stability: Low Risk  (3/24/2025)    Housing Stability Vital Sign     Unable to Pay for Housing in the Last Year: No     Number of Times Moved in the Last Year: 0     Homeless in the Last Year: No       No current outpatient medications on file.     No current facility-administered medications for this visit.       Review of patient's allergies indicates:  No Known Allergies       Objective   Vitals:    03/25/25 0939   BP: 122/64   Pulse: 89       Physical Exam  Constitutional:       General: She is not in acute distress.     Appearance: She is well-developed. She is not diaphoretic.   HENT:      Head: Normocephalic and atraumatic.      Right Ear: External ear normal.      Left Ear: External ear normal.      Nose: Nose normal.      Mouth/Throat:      Pharynx: No oropharyngeal exudate.   Eyes:      General: No scleral icterus.        Right eye: No discharge.         Left eye: No discharge.      Conjunctiva/sclera: Conjunctivae normal.      Pupils: Pupils are equal, round, and reactive to light.   Neck:      Thyroid: No thyromegaly.      Vascular: No JVD.      Trachea: No tracheal deviation.   Cardiovascular:      Rate and Rhythm: Normal rate and regular rhythm.      Heart sounds: Normal heart sounds. No murmur heard.     No friction rub. No gallop.   Pulmonary:      Effort: Pulmonary effort is normal.      Breath sounds: Normal breath sounds. No stridor. No wheezing or rales.   Chest:      Chest wall: No tenderness.   Abdominal:      General: Bowel sounds are normal. There is no distension.      Palpations: Abdomen is soft. There is no mass.      Tenderness: There is no abdominal tenderness. There is no guarding or rebound.      Hernia: No hernia is present.   Musculoskeletal:          General: No tenderness. Normal range of motion.      Cervical back: Normal range of motion and neck supple.   Lymphadenopathy:      Cervical: No cervical adenopathy.   Skin:     General: Skin is warm and dry.      Coloration: Skin is not pale.      Findings: No erythema or rash.   Neurological:      Mental Status: She is alert and oriented to person, place, and time.      Cranial Nerves: No cranial nerve deficit.      Motor: No abnormal muscle tone.      Coordination: Coordination normal.      Deep Tendon Reflexes: Reflexes are normal and symmetric. Reflexes normal.   Psychiatric:         Behavior: Behavior normal.         Thought Content: Thought content normal.         Judgment: Judgment normal.            Assessment and Plan     1. Routine general medical examination at health care facility  -     CBC Auto Differential; Future; Expected date: 03/25/2025  -     Comprehensive Metabolic Panel; Future; Expected date: 03/25/2025  -     Lipid Panel; Future; Expected date: 03/25/2025  -     TSH; Future; Expected date: 03/25/2025    2. Encounter for screening for osteoporosis  -     DXA Bone Density Axial Skeleton 1 or more sites; Future; Expected date: 03/25/2025  -     CBC Auto Differential; Future; Expected date: 03/25/2025  -     Comprehensive Metabolic Panel; Future; Expected date: 03/25/2025  -     Lipid Panel; Future; Expected date: 03/25/2025  -     TSH; Future; Expected date: 03/25/2025    3. Asymptomatic menopausal state  -     DXA Bone Density Axial Skeleton 1 or more sites; Future; Expected date: 03/25/2025  -     CBC Auto Differential; Future; Expected date: 03/25/2025  -     Comprehensive Metabolic Panel; Future; Expected date: 03/25/2025  -     Lipid Panel; Future; Expected date: 03/25/2025  -     TSH; Future; Expected date: 03/25/2025    4. Follow-up exam  -     CBC Auto Differential; Future; Expected date: 03/25/2025  -     Comprehensive Metabolic Panel; Future; Expected date: 03/25/2025  -      Lipid Panel; Future; Expected date: 03/25/2025  -     TSH; Future; Expected date: 03/25/2025    5. Medication management  -     CBC Auto Differential; Future; Expected date: 03/25/2025    6. Encounter for screening for cardiovascular disorders  -     Lipid Panel; Future; Expected date: 03/25/2025        Wellness check  -normal  -labs    Screen osteoporosis/dexa scan    Labs x 6 m    Spent adequate time in obtaining history and explaining differentials    30 minutes spent during this visit of which greater than 50% devoted to face-face counseling and coordination of care             Follow up in about 6 months (around 9/25/2025), or if symptoms worsen or fail to improve.

## 2025-03-26 ENCOUNTER — OFFICE VISIT (OUTPATIENT)
Dept: DERMATOLOGY | Facility: CLINIC | Age: 65
End: 2025-03-26
Payer: MEDICARE

## 2025-03-26 DIAGNOSIS — L82.1 SEBORRHEIC KERATOSIS: Primary | ICD-10-CM

## 2025-03-26 DIAGNOSIS — L57.8 DIFFUSE PHOTODAMAGE OF SKIN: ICD-10-CM

## 2025-03-26 PROCEDURE — 99213 OFFICE O/P EST LOW 20 MIN: CPT | Mod: S$GLB,,, | Performed by: DERMATOLOGY

## 2025-03-26 PROCEDURE — 3288F FALL RISK ASSESSMENT DOCD: CPT | Mod: CPTII,S$GLB,, | Performed by: DERMATOLOGY

## 2025-03-26 PROCEDURE — 1159F MED LIST DOCD IN RCRD: CPT | Mod: CPTII,S$GLB,, | Performed by: DERMATOLOGY

## 2025-03-26 PROCEDURE — 1101F PT FALLS ASSESS-DOCD LE1/YR: CPT | Mod: CPTII,S$GLB,, | Performed by: DERMATOLOGY

## 2025-03-26 PROCEDURE — 99999 PR PBB SHADOW E&M-EST. PATIENT-LVL II: CPT | Mod: PBBFAC,,, | Performed by: DERMATOLOGY

## 2025-03-26 PROCEDURE — 1126F AMNT PAIN NOTED NONE PRSNT: CPT | Mod: CPTII,S$GLB,, | Performed by: DERMATOLOGY

## 2025-03-26 NOTE — PROGRESS NOTES
Subjective:      Patient ID:  Jay Allison is a 64 y.o. female who presents for   Chief Complaint   Patient presents with    Mole     L buttock     Mole    Patient with new area of concern:   Location: L buttock  S/s: Changing mole, Told by Dr. Clemons in [place] via virtual visit to have mole checked in person   Previous treatments: none  Pt has H/o SCC on R upper brow in 2019  Patient reports lesion to left buttock that has been present for many years. Over the last few months, she feels the lesion has changed and is now more raised and bumpy.     Review of Systems   Skin:  Positive for activity-related sunscreen use and wears hat. Negative for daily sunscreen use and recent sunburn.   Hematologic/Lymphatic: Bruises/bleeds easily (bruises).       Objective:   Physical Exam   Constitutional: She appears well-developed and well-nourished. No distress.   Neurological: She is alert and oriented to person, place, and time. She is not disoriented.   Psychiatric: She has a normal mood and affect.   Skin:   Areas Examined (abnormalities noted in diagram):   Head / Face Inspection Performed  Neck Inspection Performed  Chest / Axilla Inspection Performed  Genitals / Buttocks / Groin Inspection Performed  Back Inspection Performed  RUE Inspected  LUE Inspection Performed            Diagram Legend     Erythematous scaling macule/papule c/w actinic keratosis       Vascular papule c/w angioma      Pigmented verrucoid papule/plaque c/w seborrheic keratosis      Yellow umbilicated papule c/w sebaceous hyperplasia      Irregularly shaped tan macule c/w lentigo     1-2 mm smooth white papules consistent with Milia      Movable subcutaneous cyst with punctum c/w epidermal inclusion cyst      Subcutaneous movable cyst c/w pilar cyst      Firm pink to brown papule c/w dermatofibroma      Pedunculated fleshy papule(s) c/w skin tag(s)      Evenly pigmented macule c/w junctional nevus     Mildly variegated pigmented, slightly  irregular-bordered macule c/w mildly atypical nevus      Flesh colored to evenly pigmented papule c/w intradermal nevus       Pink pearly papule/plaque c/w basal cell carcinoma      Erythematous hyperkeratotic cursted plaque c/w SCC      Surgical scar with no sign of skin cancer recurrence      Open and closed comedones      Inflammatory papules and pustules      Verrucoid papule consistent consistent with wart     Erythematous eczematous patches and plaques     Dystrophic onycholytic nail with subungual debris c/w onychomycosis     Umbilicated papule    Erythematous-base heme-crusted tan verrucoid plaque consistent with inflamed seborrheic keratosis     Erythematous Silvery Scaling Plaque c/w Psoriasis     See annotation      Assessment / Plan:        Seborrheic keratosis  These are benign inherited growths without a malignant potential. Reassurance given to patient. No treatment is necessary.      UBSE  Upper body skin examination performed today including at least 6 points as noted in physical examination. No lesions suspicious for malignancy noted.  Recommend daily sun protection/avoidance and use of at least SPF 30, broad spectrum sunscreen (OTC drug).     Photodamage  Patient instructed in importance in daily sun protection of at least spf 30. Sun avoidance and topical protection discussed.   Patient encouraged to wear hat for all outdoor exposure.   Also discussed sun protective clothing.    No follow-ups on file.  1 yr

## 2025-04-03 ENCOUNTER — PATIENT MESSAGE (OUTPATIENT)
Dept: FAMILY MEDICINE | Facility: CLINIC | Age: 65
End: 2025-04-03
Payer: MEDICARE

## 2025-04-03 NOTE — TELEPHONE ENCOUNTER
Please inform    Dexa showed as osteopenia - it is mild bone loss - recommend calcium and Vit D OTC

## 2025-06-23 DIAGNOSIS — Z00.00 ENCOUNTER FOR MEDICARE ANNUAL WELLNESS EXAM: ICD-10-CM

## 2025-07-11 ENCOUNTER — OFFICE VISIT (OUTPATIENT)
Dept: FAMILY MEDICINE | Facility: CLINIC | Age: 65
End: 2025-07-11
Payer: MEDICARE

## 2025-07-11 VITALS — BODY MASS INDEX: 23.07 KG/M2 | HEIGHT: 60 IN | WEIGHT: 117.5 LBS

## 2025-07-11 DIAGNOSIS — D69.2 PURPURA: ICD-10-CM

## 2025-07-11 DIAGNOSIS — Z00.00 ENCOUNTER FOR MEDICARE ANNUAL WELLNESS EXAM: Primary | ICD-10-CM

## 2025-07-11 PROCEDURE — 1160F RVW MEDS BY RX/DR IN RCRD: CPT | Mod: CPTII,HCNC,95, | Performed by: NURSE PRACTITIONER

## 2025-07-11 PROCEDURE — 1126F AMNT PAIN NOTED NONE PRSNT: CPT | Mod: CPTII,HCNC,95, | Performed by: NURSE PRACTITIONER

## 2025-07-11 PROCEDURE — 1158F ADVNC CARE PLAN TLK DOCD: CPT | Mod: CPTII,HCNC,95, | Performed by: NURSE PRACTITIONER

## 2025-07-11 PROCEDURE — 1159F MED LIST DOCD IN RCRD: CPT | Mod: CPTII,HCNC,95, | Performed by: NURSE PRACTITIONER

## 2025-07-11 PROCEDURE — G0439 PPPS, SUBSEQ VISIT: HCPCS | Mod: HCNC,95,, | Performed by: NURSE PRACTITIONER

## 2025-07-11 RX ORDER — MULTIVITAMIN
1 TABLET ORAL DAILY
COMMUNITY

## 2025-07-11 NOTE — PATIENT INSTRUCTIONS
Counseling and Referral of Other Preventative  (Italic type indicates deductible and co-insurance are waived)    Patient Name: Jay Allison  Today's Date: 7/11/2025    Health Maintenance       Date Due Completion Date    Hepatitis C Screening Never done ---    HIV Screening Never done ---    Shingles Vaccine (1 of 2) Never done ---    Pneumococcal Vaccines (Age 50+) (1 of 1 - PCV) Never done ---    COVID-19 Vaccine (3 - 2024-25 season) 09/01/2024 3/25/2021    Mammogram 08/22/2025 8/22/2024    Influenza Vaccine (1) 09/01/2025 11/30/2024    TETANUS VACCINE 05/13/2026 5/13/2016    Colorectal Cancer Screening 12/06/2027 12/6/2024    Lipid Panel 08/22/2029 8/22/2024    DEXA Scan 04/03/2030 4/3/2025    RSV Vaccine (Age 60+ and Pregnant patients) (1 - 1-dose 75+ series) 11/27/2034 ---        No orders of the defined types were placed in this encounter.    The following information is provided to all patients.  This information is to help you find resources for any of the problems found today that may be affecting your health:                  Living healthy guide: www.[facility].[place].gov      Understanding Diabetes: www.diabetes.org      Eating healthy: www.cdc.gov/healthyweight      CDC home safety checklist: www.cdc.gov/steadi/patient.html      Agency on Aging: www.goea.[place].gov      Alcoholics anonymous (AA): www.aa.org      Physical Activity: www.dom.nih.gov/tl7lcee      Tobacco use: www.quitwithusla.org

## 2025-07-11 NOTE — PROGRESS NOTES
The patient location is: [place]  The chief complaint leading to consultation is: Medicare AWV  Visit type: audiovisual  Face to Face time with patient: 39 min  50 minutes of total time spent on the encounter, which includes face to face time and non-face to face time preparing to see the patient (eg, review of tests), Obtaining and/or reviewing separately obtained history, Documenting clinical information in the electronic or other health record, Independently interpreting results (not separately reported) and communicating results to the patient/family/caregiver, or Care coordination (not separately reported).     Each patient to whom he or she provides medical services by telemedicine is:  (1) informed of the relationship between the physician and patient and the respective role of any other health care provider with respect to management of the patient; and (2) notified that he or she may decline to receive medical services by telemedicine and may withdraw from such care at any time.    Jay Allison presented for a  Medicare AWV and comprehensive Health Risk Assessment today. The following components were reviewed and updated:    Medical history  Family History  Social history  Allergies and Current Medications  Health Risk Assessment  Health Maintenance  Care Team         ** See Completed Assessments for Annual Wellness Visit within the encounter summary.**         The following assessments were completed:  Living Situation  CAGE  Depression Screening  Fall Risk Assessment (MACH 10)  Hearing Assessment(HHI)  Cognitive Function Screening  Nutrition Screening  ADL Screening  PAQ Screening    Opioid documentation:      Patient does not have a current opioid prescription.        Vitals:    07/11/25 0955   Weight: 53.3 kg (117 lb 8.1 oz)   Height: 5' (1.524 m)     Body mass index is 22.95 kg/m².    Physical Exam  Constitutional:       General: She is not in acute distress.     Appearance: Normal appearance.  She is well-developed and well-groomed.   HENT:      Head: Normocephalic.   Pulmonary:      Effort: Pulmonary effort is normal. No respiratory distress.   Skin:     Coloration: Skin is not pale.      Comments: Purpura to right arm, see attached photo   Neurological:      Mental Status: She is alert and oriented to person, place, and time.   Psychiatric:         Attention and Perception: Attention normal.         Mood and Affect: Mood and affect normal.         Speech: Speech normal.         Behavior: Behavior normal. Behavior is cooperative.         Physical exam limited d/t virtual visit. Patient does not appear to be in any respiratory distress. Able to speak in complete sentences without becoming short of breath.        Diagnoses and health risks identified today and associated recommendations/orders:    1. Encounter for Medicare annual wellness exam    2. Purpura  As seen on attached photo; follow up with PCP.    3. Body mass index (BMI) of 22.0 to 22.9 in adult  Patient's BMI is WNL. Eat a well-balanced, heart-healthy diet and discussed importance of engaging in physical activity at least 5x/week for a minimum of 30 min/day.      Provided Jay Shi with a 5-10 year written screening schedule and personal prevention plan. Recommendations were developed using the USPSTF age appropriate recommendations. Education, counseling, and referrals were provided as needed. After Visit Summary given to patient which includes a list of additional screenings/tests needed.    Follow up for your next annual wellness visit.    Briana Gonzales NP      Advance Care Planning     I offered to discuss advanced care planning, including how to pick a person who would make decisions for you if you were unable to make them for yourself, called a health care power of , and what kind of decisions you might make such as use of life sustaining treatments such as ventilators and tube feeding when faced with a life limiting  illness recorded on a living will that they will need to know. (How you want to be cared for as you near the end of your natural life)     X Patient is interested in learning more about how to make advanced directives.  I offered to discuss this with them and instructed to follow up with PCP.

## 2025-07-12 PROBLEM — R05.9 COUGH: Status: RESOLVED | Noted: 2018-12-08 | Resolved: 2025-07-12

## 2025-07-12 PROBLEM — J02.9 ACUTE PHARYNGITIS: Status: RESOLVED | Noted: 2018-12-08 | Resolved: 2025-07-12

## 2025-07-12 PROBLEM — J20.9 ACUTE BRONCHITIS: Status: RESOLVED | Noted: 2018-03-21 | Resolved: 2025-07-12
